# Patient Record
Sex: FEMALE | HISPANIC OR LATINO | Employment: FULL TIME | URBAN - METROPOLITAN AREA
[De-identification: names, ages, dates, MRNs, and addresses within clinical notes are randomized per-mention and may not be internally consistent; named-entity substitution may affect disease eponyms.]

---

## 2013-09-29 LAB
EXTERNAL HIV CONFIRMATION: NORMAL
EXTERNAL HIV SCREEN: NORMAL

## 2019-02-27 ENCOUNTER — OFFICE VISIT (OUTPATIENT)
Dept: FAMILY MEDICINE CLINIC | Facility: CLINIC | Age: 34
End: 2019-02-27
Payer: COMMERCIAL

## 2019-02-27 VITALS
WEIGHT: 206.4 LBS | BODY MASS INDEX: 32.39 KG/M2 | DIASTOLIC BLOOD PRESSURE: 78 MMHG | HEIGHT: 67 IN | SYSTOLIC BLOOD PRESSURE: 120 MMHG

## 2019-02-27 DIAGNOSIS — N92.6 IRREGULAR PERIODS: ICD-10-CM

## 2019-02-27 DIAGNOSIS — R63.5 WEIGHT GAIN: ICD-10-CM

## 2019-02-27 DIAGNOSIS — R42 DIZZINESS: ICD-10-CM

## 2019-02-27 DIAGNOSIS — F43.9 STRESS AT HOME: ICD-10-CM

## 2019-02-27 DIAGNOSIS — E66.9 OBESITY (BMI 30-39.9): Primary | ICD-10-CM

## 2019-02-27 DIAGNOSIS — Z13.220 SCREENING FOR HYPERLIPIDEMIA: ICD-10-CM

## 2019-02-27 PROCEDURE — 1036F TOBACCO NON-USER: CPT | Performed by: FAMILY MEDICINE

## 2019-02-27 PROCEDURE — 99203 OFFICE O/P NEW LOW 30 MIN: CPT | Performed by: FAMILY MEDICINE

## 2019-02-27 PROCEDURE — 3008F BODY MASS INDEX DOCD: CPT | Performed by: FAMILY MEDICINE

## 2019-02-27 NOTE — PROGRESS NOTES
Assessment/Plan:  Chief Complaint   Patient presents with    SLP Initial Evaluation     new patient here to establish care    Weight Gain     noticed throughout the last year has gained about 40 lbs  Has as irregular menstrual cycle   Dizziness     concerned of having low blood sugars     Patient Instructions   Here to establish care and has had some weight gain 40 pounds in the last year  There is much stress at home and work and also will need to see her GYN and also SBE encouraged as well as dental exams  Check labs for hx of dizziness  F-up with GYN for irregular periods  No problem-specific Assessment & Plan notes found for this encounter  Diagnoses and all orders for this visit:    Obesity (BMI 30-39 9)  -     TSH, 3rd generation; Future  -     T4, free  -     Hemoglobin A1C    Weight gain  -     Hemoglobin A1C    Dizziness  -     Comprehensive metabolic panel; Future  -     CBC and differential; Future  -     Hemoglobin A1C    Stress at home  -     Hemoglobin A1C    Screening for hyperlipidemia  -     Comprehensive metabolic panel; Future  -     Lipid Panel with Direct LDL reflex; Future  -     TSH, 3rd generation; Future  -     T4, free    Irregular periods  -     Hemoglobin A1C          Subjective:      Patient ID: Evi Soares is a 28 y o  female  Here for establishing care  SLP Initial Evaluation (new patient here to establish care)  Weight Gain (noticed throughout the last year has gained about 40 lbs  Has as irregular menstrual cycle  )  Dizziness (concerned of having low blood sugars)    Has had stress from a separation in the last year and eating more for comfort  Her period has also stopped 6 months ago but did get her period last month         The following portions of the patient's history were reviewed and updated as appropriate: allergies, current medications, past family history, past medical history, past social history, past surgical history and problem list     Review of Systems   Constitutional:        Obesity   HENT: Negative  Eyes: Negative  Respiratory: Negative  Cardiovascular: Negative  Gastrointestinal: Negative  Endocrine: Negative  Genitourinary:        Irregular periods   Musculoskeletal: Negative  Skin: Negative  Allergic/Immunologic: Negative  Neurological: Negative  Hematological: Negative  Psychiatric/Behavioral: Negative  Objective:      /78   Ht 5' 6 5" (1 689 m)   Wt 93 6 kg (206 lb 6 4 oz)   BMI 32 81 kg/m²          Physical Exam   Constitutional: She is oriented to person, place, and time  She appears well-developed and well-nourished  obesity   HENT:   Head: Normocephalic and atraumatic  Right Ear: External ear normal    Left Ear: External ear normal    Nose: Nose normal    Mouth/Throat: Oropharynx is clear and moist    Eyes: Pupils are equal, round, and reactive to light  Conjunctivae and EOM are normal    Neck: Normal range of motion  Neck supple  Cardiovascular: Normal rate, regular rhythm, normal heart sounds and intact distal pulses  Pulmonary/Chest: Effort normal and breath sounds normal    Musculoskeletal: Normal range of motion  Neurological: She is alert and oriented to person, place, and time  She has normal reflexes  Skin: Skin is warm and dry  Psychiatric: She has a normal mood and affect   Her behavior is normal

## 2019-02-27 NOTE — PATIENT INSTRUCTIONS
Here to establish care and has had some weight gain 40 pounds in the last year  There is much stress at home and work and also will need to see her GYN and also SBE encouraged as well as dental exams  Check labs for hx of dizziness  F-up with GYN for irregular periods

## 2019-03-13 LAB
ALBUMIN SERPL-MCNC: 4.2 G/DL (ref 3.6–5.1)
ALBUMIN/GLOB SERPL: 1.4 (CALC) (ref 1–2.5)
ALP SERPL-CCNC: 112 U/L (ref 33–115)
ALT SERPL-CCNC: 17 U/L (ref 6–29)
AST SERPL-CCNC: 15 U/L (ref 10–30)
BASOPHILS # BLD AUTO: 44 CELLS/UL (ref 0–200)
BASOPHILS NFR BLD AUTO: 0.5 %
BILIRUB SERPL-MCNC: 0.4 MG/DL (ref 0.2–1.2)
BUN SERPL-MCNC: 14 MG/DL (ref 7–25)
BUN/CREAT SERPL: NORMAL (CALC) (ref 6–22)
CALCIUM SERPL-MCNC: 9 MG/DL (ref 8.6–10.2)
CHLORIDE SERPL-SCNC: 104 MMOL/L (ref 98–110)
CHOLEST SERPL-MCNC: 177 MG/DL
CHOLEST/HDLC SERPL: 3.8 (CALC)
CO2 SERPL-SCNC: 27 MMOL/L (ref 20–32)
CREAT SERPL-MCNC: 0.79 MG/DL (ref 0.5–1.1)
EOSINOPHIL # BLD AUTO: 78 CELLS/UL (ref 15–500)
EOSINOPHIL NFR BLD AUTO: 0.9 %
ERYTHROCYTE [DISTWIDTH] IN BLOOD BY AUTOMATED COUNT: 12.4 % (ref 11–15)
GLOBULIN SER CALC-MCNC: 3 G/DL (CALC) (ref 1.9–3.7)
GLUCOSE SERPL-MCNC: 93 MG/DL (ref 65–99)
HBA1C MFR BLD: 5.7 % OF TOTAL HGB
HCT VFR BLD AUTO: 37.1 % (ref 35–45)
HDLC SERPL-MCNC: 46 MG/DL
HGB BLD-MCNC: 12.7 G/DL (ref 11.7–15.5)
LDLC SERPL CALC-MCNC: 108 MG/DL (CALC)
LYMPHOCYTES # BLD AUTO: 1897 CELLS/UL (ref 850–3900)
LYMPHOCYTES NFR BLD AUTO: 21.8 %
MCH RBC QN AUTO: 31.6 PG (ref 27–33)
MCHC RBC AUTO-ENTMCNC: 34.2 G/DL (ref 32–36)
MCV RBC AUTO: 92.3 FL (ref 80–100)
MONOCYTES # BLD AUTO: 531 CELLS/UL (ref 200–950)
MONOCYTES NFR BLD AUTO: 6.1 %
NEUTROPHILS # BLD AUTO: 6151 CELLS/UL (ref 1500–7800)
NEUTROPHILS NFR BLD AUTO: 70.7 %
NONHDLC SERPL-MCNC: 131 MG/DL (CALC)
PLATELET # BLD AUTO: 359 THOUSAND/UL (ref 140–400)
PMV BLD REES-ECKER: 9.2 FL (ref 7.5–12.5)
POTASSIUM SERPL-SCNC: 4.2 MMOL/L (ref 3.5–5.3)
PROT SERPL-MCNC: 7.2 G/DL (ref 6.1–8.1)
RBC # BLD AUTO: 4.02 MILLION/UL (ref 3.8–5.1)
SL AMB EGFR AFRICAN AMERICAN: 113 ML/MIN/1.73M2
SL AMB EGFR NON AFRICAN AMERICAN: 98 ML/MIN/1.73M2
SODIUM SERPL-SCNC: 138 MMOL/L (ref 135–146)
T4 FREE SERPL-MCNC: 1 NG/DL (ref 0.8–1.8)
TRIGL SERPL-MCNC: 115 MG/DL
TSH SERPL-ACNC: 1.96 MIU/L
WBC # BLD AUTO: 8.7 THOUSAND/UL (ref 3.8–10.8)

## 2019-05-21 ENCOUNTER — OFFICE VISIT (OUTPATIENT)
Dept: FAMILY MEDICINE CLINIC | Facility: CLINIC | Age: 34
End: 2019-05-21
Payer: COMMERCIAL

## 2019-05-21 VITALS
BODY MASS INDEX: 31.74 KG/M2 | WEIGHT: 202.2 LBS | HEIGHT: 67 IN | DIASTOLIC BLOOD PRESSURE: 76 MMHG | SYSTOLIC BLOOD PRESSURE: 122 MMHG | TEMPERATURE: 99.3 F | HEART RATE: 86 BPM | OXYGEN SATURATION: 99 %

## 2019-05-21 DIAGNOSIS — J32.9 SINUSITIS, UNSPECIFIED CHRONICITY, UNSPECIFIED LOCATION: ICD-10-CM

## 2019-05-21 DIAGNOSIS — J06.9 UPPER RESPIRATORY TRACT INFECTION, UNSPECIFIED TYPE: ICD-10-CM

## 2019-05-21 DIAGNOSIS — J04.0 LARYNGITIS: ICD-10-CM

## 2019-05-21 DIAGNOSIS — R05.9 COUGH: Primary | ICD-10-CM

## 2019-05-21 PROCEDURE — 99213 OFFICE O/P EST LOW 20 MIN: CPT | Performed by: FAMILY MEDICINE

## 2019-05-21 RX ORDER — PROMETHAZINE HYDROCHLORIDE AND CODEINE PHOSPHATE 6.25; 1 MG/5ML; MG/5ML
5 SYRUP ORAL EVERY 12 HOURS PRN
Qty: 120 ML | Refills: 0 | Status: SHIPPED | OUTPATIENT
Start: 2019-05-21 | End: 2019-06-10 | Stop reason: ALTCHOICE

## 2019-05-21 RX ORDER — AZITHROMYCIN 250 MG/1
TABLET, FILM COATED ORAL
Qty: 6 TABLET | Refills: 0 | Status: SHIPPED | OUTPATIENT
Start: 2019-05-21 | End: 2019-05-26

## 2019-06-10 ENCOUNTER — OFFICE VISIT (OUTPATIENT)
Dept: FAMILY MEDICINE CLINIC | Facility: CLINIC | Age: 34
End: 2019-06-10
Payer: COMMERCIAL

## 2019-06-10 VITALS
DIASTOLIC BLOOD PRESSURE: 82 MMHG | HEART RATE: 79 BPM | BODY MASS INDEX: 31.86 KG/M2 | TEMPERATURE: 98.5 F | OXYGEN SATURATION: 99 % | WEIGHT: 203 LBS | HEIGHT: 67 IN | SYSTOLIC BLOOD PRESSURE: 120 MMHG

## 2019-06-10 DIAGNOSIS — R73.03 PREDIABETES: Primary | ICD-10-CM

## 2019-06-10 DIAGNOSIS — F43.9 STRESS: ICD-10-CM

## 2019-06-10 DIAGNOSIS — E66.9 OBESITY (BMI 30-39.9): ICD-10-CM

## 2019-06-10 PROCEDURE — 1036F TOBACCO NON-USER: CPT | Performed by: FAMILY MEDICINE

## 2019-06-10 PROCEDURE — 3008F BODY MASS INDEX DOCD: CPT | Performed by: FAMILY MEDICINE

## 2019-06-10 PROCEDURE — 99213 OFFICE O/P EST LOW 20 MIN: CPT | Performed by: FAMILY MEDICINE

## 2020-05-30 DIAGNOSIS — K04.7 DENTAL INFECTION: ICD-10-CM

## 2020-05-30 DIAGNOSIS — G89.18 POST-OP PAIN: Primary | ICD-10-CM

## 2020-05-30 RX ORDER — CHLORHEXIDINE GLUCONATE 0.12 MG/ML
15 RINSE ORAL 2 TIMES DAILY
Qty: 210 ML | Refills: 0 | Status: SHIPPED | OUTPATIENT
Start: 2020-05-30 | End: 2020-06-06

## 2020-05-30 RX ORDER — HYDROCODONE BITARTRATE AND ACETAMINOPHEN 5; 325 MG/1; MG/1
1 TABLET ORAL EVERY 6 HOURS PRN
Qty: 8 TABLET | Refills: 0 | Status: SHIPPED | OUTPATIENT
Start: 2020-05-30 | End: 2022-06-04 | Stop reason: ALTCHOICE

## 2020-05-30 RX ORDER — AMOXICILLIN AND CLAVULANATE POTASSIUM 875; 125 MG/1; MG/1
1 TABLET, FILM COATED ORAL EVERY 12 HOURS SCHEDULED
Qty: 14 TABLET | Refills: 0 | Status: SHIPPED | OUTPATIENT
Start: 2020-05-30 | End: 2020-06-06

## 2020-05-30 RX ORDER — IBUPROFEN 600 MG/1
600 TABLET ORAL EVERY 6 HOURS PRN
Qty: 20 TABLET | Refills: 0 | Status: SHIPPED | OUTPATIENT
Start: 2020-05-30 | End: 2020-06-04

## 2020-06-24 ENCOUNTER — TELEPHONE (OUTPATIENT)
Dept: FAMILY MEDICINE CLINIC | Facility: CLINIC | Age: 35
End: 2020-06-24

## 2020-06-24 DIAGNOSIS — E66.9 OBESITY (BMI 30-39.9): Primary | ICD-10-CM

## 2020-06-26 ENCOUNTER — OFFICE VISIT (OUTPATIENT)
Dept: BARIATRICS | Facility: CLINIC | Age: 35
End: 2020-06-26

## 2020-06-26 DIAGNOSIS — R63.5 ABNORMAL WEIGHT GAIN: Primary | ICD-10-CM

## 2020-06-26 PROCEDURE — RECHECK

## 2020-06-26 PROCEDURE — WMDI30

## 2020-07-08 ENCOUNTER — TELEPHONE (OUTPATIENT)
Dept: OTHER | Facility: OTHER | Age: 35
End: 2020-07-08

## 2020-07-08 NOTE — TELEPHONE ENCOUNTER
Patient called to cancel the appointment for 0830 today  Patient requests to be called to reschedule

## 2021-08-12 ENCOUNTER — TELEPHONE (OUTPATIENT)
Dept: ADMINISTRATIVE | Facility: OTHER | Age: 36
End: 2021-08-12

## 2021-08-12 NOTE — TELEPHONE ENCOUNTER
Upon review of the In Basket request we were able to locate, review, and update the patient chart as requested for Pap Smear (HPV) aka Cervical Cancer Screening  Any additional questions or concerns should be emailed to the Practice Liaisons via [email or web address] email, please do not reply via In Basket      Thank you  Katie Dykes

## 2021-08-12 NOTE — TELEPHONE ENCOUNTER
----- Message from Clarke Garcia sent at 8/11/2021  8:49 AM EDT -----  Regarding: Request Quality  08/11/21 8:49 AM    Hello, our patient Scooby Baig has had Pap Smear (HPV) aka Cervical Cancer Screening completed/performed  Please assist in updating the patient chart by pulling the Care Everywhere (CE) document  The date of service is 11/04/2020       Thank you,  Clarke Garcia  [id number]

## 2021-08-16 ENCOUNTER — OFFICE VISIT (OUTPATIENT)
Dept: FAMILY MEDICINE CLINIC | Facility: CLINIC | Age: 36
End: 2021-08-16
Payer: COMMERCIAL

## 2021-08-16 VITALS
HEART RATE: 78 BPM | WEIGHT: 213.2 LBS | RESPIRATION RATE: 16 BRPM | DIASTOLIC BLOOD PRESSURE: 82 MMHG | HEIGHT: 66 IN | SYSTOLIC BLOOD PRESSURE: 124 MMHG | BODY MASS INDEX: 34.27 KG/M2 | TEMPERATURE: 97.5 F | OXYGEN SATURATION: 99 %

## 2021-08-16 DIAGNOSIS — R73.03 PREDIABETES: ICD-10-CM

## 2021-08-16 DIAGNOSIS — B07.0 PLANTAR WART OF RIGHT FOOT: ICD-10-CM

## 2021-08-16 DIAGNOSIS — Z00.00 HEALTH CARE MAINTENANCE: Primary | ICD-10-CM

## 2021-08-16 DIAGNOSIS — Z13.220 SCREENING FOR HYPERLIPIDEMIA: ICD-10-CM

## 2021-08-16 DIAGNOSIS — E66.9 OBESITY (BMI 30-39.9): ICD-10-CM

## 2021-08-16 PROCEDURE — 3725F SCREEN DEPRESSION PERFORMED: CPT | Performed by: FAMILY MEDICINE

## 2021-08-16 PROCEDURE — 99395 PREV VISIT EST AGE 18-39: CPT | Performed by: FAMILY MEDICINE

## 2021-08-16 PROCEDURE — 3008F BODY MASS INDEX DOCD: CPT | Performed by: FAMILY MEDICINE

## 2021-08-16 PROCEDURE — 1036F TOBACCO NON-USER: CPT | Performed by: FAMILY MEDICINE

## 2021-08-16 NOTE — PROGRESS NOTES
Assessment/Plan:  Chief Complaint   Patient presents with    Weight Loss     patient would like to discuss she is having difficulty losing weight     Foot Injury     patient is having right foot pain scale 6/10     Annual Exam     Patient Instructions   Here for general PE and will need updated labs and will need to see GYN and also rec podiatry consult for right heel wart and callous right heel  Lose weight via diet and exercise, discussed HR zone 2 and weight loss  Recheck yearly for annual PE  COVID 19 vaccinated  She will call if interested in weight loss consult  No problem-specific Assessment & Plan notes found for this encounter  Diagnoses and all orders for this visit:    Health care maintenance  -     Comprehensive metabolic panel; Future  -     CBC and differential; Future  -     Lipid Panel with Direct LDL reflex; Future  -     TSH, 3rd generation; Future  -     T4, free  -     Hemoglobin A1C    Prediabetes  -     Comprehensive metabolic panel; Future  -     Hemoglobin A1C    Obesity (BMI 30-39 9)  -     Comprehensive metabolic panel; Future  -     TSH, 3rd generation; Future  -     T4, free    Plantar wart of right foot  -     Ambulatory referral to Podiatry; Future    Screening for hyperlipidemia  -     Comprehensive metabolic panel; Future  -     Lipid Panel with Direct LDL reflex; Future  -     TSH, 3rd generation; Future  -     T4, free          Subjective:      Patient ID: Saadia Arnett is a 38 y o  female     having difficulty losing weight )  Foot Injury (patient is having right foot pain scale 6/10 )  Annual Exam  Tried keto and Intermittent fasting and low carb and starting to affect knees and       The following portions of the patient's history were reviewed and updated as appropriate: allergies, current medications, past family history, past medical history, past social history, past surgical history and problem list     Review of Systems   Constitutional: Negative

## 2021-08-16 NOTE — PATIENT INSTRUCTIONS
Here for general PE and will need updated labs and will need to see GYN and also rec podiatry consult for right heel wart and callous right heel  Lose weight via diet and exercise, discussed HR zone 2 and weight loss  Recheck yearly for annual PE  COVID 19 vaccinated  She will call if interested in weight loss consult

## 2021-08-24 ENCOUNTER — OFFICE VISIT (OUTPATIENT)
Dept: URGENT CARE | Facility: CLINIC | Age: 36
End: 2021-08-24

## 2021-08-24 VITALS
TEMPERATURE: 99.4 F | RESPIRATION RATE: 16 BRPM | SYSTOLIC BLOOD PRESSURE: 140 MMHG | OXYGEN SATURATION: 98 % | DIASTOLIC BLOOD PRESSURE: 88 MMHG | HEART RATE: 74 BPM

## 2021-08-24 DIAGNOSIS — S96.911A ANKLE STRAIN, RIGHT, INITIAL ENCOUNTER: Primary | ICD-10-CM

## 2021-08-24 RX ORDER — NAPROXEN 500 MG/1
500 TABLET ORAL 2 TIMES DAILY WITH MEALS
Qty: 20 TABLET | Refills: 0 | Status: SHIPPED | OUTPATIENT
Start: 2021-08-24 | End: 2021-09-03

## 2021-08-24 NOTE — ASSESSMENT & PLAN NOTE
History and exam findings consistent with ankle strain secondary to altered gait due to pain at site of wart and callous on heel  Ace wrap and ice pack applied, symptoms improved  Motor and sensory function intact distally after application, capillary refill <2 seconds  Recommend pt f/u with podiatry for treatment of wart and callous, and rest, ice, compression, and NSAIDs for ankle sprain  Gentle stretching recommended, advance slowly as tolerated  Work note provided to wear supportive footwear in the office, such as sneakers  Reasons to follow up reviewed with pt

## 2021-08-24 NOTE — PROGRESS NOTES
[facility]        NAME: Reese Cruz is a 38 y o  female  : 1985    MRN: [id number]  DATE: 2021  TIME: 9:58 AM    Assessment and Plan   Ankle strain, right, initial encounter [S96 911A]  1  Ankle strain, right, initial encounter  naproxen (NAPROSYN) 500 mg tablet         Patient Instructions       Follow up with podiatry for removal of wart and callous  Wear ace wrap during the day  Remove at night  Apply ice several times daily  Take Naproxen as prescribed, take with food  Wear supportive footwear  Recommend gentle stretching, advance slowly as tolerated  Follow up with your PCP or return to the clinic if symptoms worsen or persist more than 7-10 days  Proceed to  ER if symptoms worsen  Chief Complaint     Chief Complaint   Patient presents with    Ankle Pain     Right         History of Present Illness       R ankle swelling x 1 week  She saw her PCP and was diagnosed with a wart and callous on her R heel  No swelling at that time  She was referred to podiatry for removal but has not seen them yet  Pain to site of wart on R plantar side of heel when walking  Swelling to R lateral ankle started afterwards  Swelling is worse later in the day  No falls or injuries reported  She has been soaking her foot in warm epsom salts- mild improvement  No fever, chills, or warmth  Review of Systems   Review of Systems   Constitutional: Negative  Respiratory: Negative  Cardiovascular: Negative  Musculoskeletal: Positive for gait problem, joint swelling (swelling to R lateral ankle) and myalgias  Skin: Positive for wound  Rash: wart and callous to R heel  All other systems reviewed and are negative          Current Medications       Current Outpatient Medications:     HYDROcodone-acetaminophen (NORCO) 5-325 mg per tablet, Take 1 tablet by mouth every 6 (six) hours as needed for painMax Daily Amount: 4 tablets (Patient not taking: Reported on 2021), Disp: 8 tablet, Rfl: 0    ibuprofen (MOTRIN) 600 mg tablet, Take 1 tablet (600 mg total) by mouth every 6 (six) hours as needed for mild pain or moderate pain for up to 5 days, Disp: 20 tablet, Rfl: 0    naproxen (NAPROSYN) 500 mg tablet, Take 1 tablet (500 mg total) by mouth 2 (two) times a day with meals for 10 days, Disp: 20 tablet, Rfl: 0    Current Allergies     Allergies as of 2021    (No Known Allergies)            The following portions of the patient's history were reviewed and updated as appropriate: allergies, current medications, past family history, past medical history, past social history, past surgical history and problem list      Past Medical History:   Diagnosis Date    Prediabetes        Past Surgical History:   Procedure Laterality Date     SECTION         Family History   Problem Relation Age of Onset    Breast cancer Family     Diabetes Mother     Heart disease Father          Medications have been verified  Objective   /88 (BP Location: Right arm, Patient Position: Sitting, Cuff Size: Adult)   Pulse 74   Temp 99 4 °F (37 4 °C) (Tympanic)   Resp 16   LMP 2021 (Exact Date)   SpO2 98%   Patient's last menstrual period was 2021 (exact date)  Physical Exam     Physical Exam  Constitutional:       Appearance: She is well-developed  HENT:      Head: Normocephalic and atraumatic  Musculoskeletal:         General: Swelling and tenderness present  No signs of injury  Right ankle: Swelling present  No deformity, ecchymosis or lacerations  Tenderness present over the CF ligament  Normal range of motion  Anterior drawer test negative  Normal pulse  Right Achilles Tendon: Normal       Left ankle: Normal       Left Achilles Tendon: Normal       Right foot: Normal capillary refill  Tenderness (wart noted to  R heel, plantar side with surrounding callous and fissure) present  No bony tenderness  Normal pulse        Left foot: Normal  Legs:    Skin:     General: Skin is warm and dry  Capillary Refill: Capillary refill takes less than 2 seconds  Neurological:      General: No focal deficit present  Mental Status: She is alert and oriented to person, place, and time     Psychiatric:         Mood and Affect: Mood normal          Behavior: Behavior normal

## 2021-08-24 NOTE — PATIENT INSTRUCTIONS
Follow up with podiatry for removal of wart and callous  Wear ace wrap during the day  Remove at night  Apply ice several times daily  Take Naproxen as prescribed, take with food  Wear supportive footwear  Recommend gentle stretching, advance slowly as tolerated  Follow up with your PCP or return to the clinic if symptoms worsen or persist more than 7-10 days  Ankle Strain   AMBULATORY CARE:   An ankle strain  is a twist, pull, or tear of a muscle or tendon in your ankle  An acute strain is a strain that happens suddenly  A chronic strain can happen over several days or weeks  A chronic strain can be caused by moving the muscle and tendon in your ankle the same way over and over  Common signs and symptoms include the following:   · Pain and swelling in your ankle    · Trouble moving your ankle    · Muscle spasm, cramping, or weakness    · Bruising over your ankle    Seek care immediately if:   · You have severe pain in your ankle when you rest or put pressure on it  · Your foot or toes are cold or numb  · Your swelling has increased  Contact your healthcare provider if:   · Your pain or swelling do not go away, even after treatment  · You have questions or concerns about your condition or care  Treatment:   · A support device , such as crutches or a brace, may be needed to decrease your pain as you move around  · NSAIDs , such as ibuprofen, help decrease swelling, pain, and fever  This medicine is available with or without a doctor's order  NSAIDs can cause stomach bleeding or kidney problems in certain people  If you take blood thinner medicine, always ask if NSAIDs are safe for you  Always read the medicine label and follow directions  Do not give these medicines to children under 10months of age without direction from your child's healthcare provider  · Acetaminophen  decreases pain  It is available without a doctor's order  Ask how much to take and how often to take it  Follow directions  Acetaminophen can cause liver damage if not taken correctly  · Physical therapy  may be recommended after your ankle strain has healed  A physical therapist teaches you exercises to help improve movement and strength, and to decrease pain  · Surgery  may be needed if you have a severe strain  Manage your symptoms:   · Rest  your ankle so that it can heal  Return to normal activities as directed  · Apply ice on your ankle for 15 to 20 minutes every hour or as directed  Use an ice pack, or put crushed ice in a plastic bag  Cover it with a towel  Ice helps prevent tissue damage and decreases swelling and pain  · Compress  your ankle as directed  Ask your healthcare provider how to wrap an elastic bandage around your ankle  An elastic bandage provides support and helps decrease swelling and movement so your ankle can heal  Wear it as long as directed  · Elevate  your ankle above the level of your heart as often as you can  This will help decrease swelling and pain  Prop your ankle on pillows or blankets to keep it elevated comfortably  Prevent an ankle strain:   · Always wear proper shoes when you play sports  Replace your old running shoes with new ones often if you are a runner  Use special shoe inserts or arch supports to correct leg or foot problems  Ask your healthcare provider for more information on shoe supports  · Do warm up and cool down exercises  Stretch before you work out or do sports activities  This will help loosen your muscles and prevent injury  Cool down and stretch after your workout  Do not stop and rest after a workout without cooling down first      · Do strength training exercises  Exercises such as weight lifting help keep your muscles flexible and strong  A physical therapist or  may help you with these exercises       · Slowly start your exercise or sports training program   Follow your healthcare provider's advice on when to start exercising  Slowly increase time, distance, and intensity of your exercises  Sudden increases in how often or how intensely you train may cause you to injure your muscle again  Follow up with your healthcare provider as directed:  Write down your questions so you remember to ask them during your visits  © Copyright [employer] 2021 Information is for End User's use only and may not be sold, redistributed or otherwise used for commercial purposes  All illustrations and images included in CareNotes® are the copyrighted property of [employer]  or Ange Mejia  The above information is an  only  It is not intended as medical advice for individual conditions or treatments  Talk to your doctor, nurse or pharmacist before following any medical regimen to see if it is safe and effective for you

## 2021-08-24 NOTE — LETTER
August 24, 2021     Patient: Elsa De La Rosa   YOB: 1985   Date of Visit: 8/24/2021       To Whom It May Concern: It is my medical opinion that Landy Brown can return to work while wearing sneakers for proper foot support       If you have any questions or concerns, please don't hesitate to call           Sincerely,        [facility] [address] [facility]    CC: No Recipients

## 2021-08-31 ENCOUNTER — TELEPHONE (OUTPATIENT)
Dept: FAMILY MEDICINE CLINIC | Facility: CLINIC | Age: 36
End: 2021-08-31

## 2021-08-31 DIAGNOSIS — Z20.822 ENCOUNTER FOR LABORATORY TESTING FOR COVID-19 VIRUS: Primary | ICD-10-CM

## 2021-08-31 NOTE — TELEPHONE ENCOUNTER
Patient called stating she was exposed to an employee that is positive for covid, she exposed on 08/25/21  No mask worn and was at lunch with her, patient has no symptoms as of now  Can we order a covid test for her  I did advise patient to quarantine until she gets the results back

## 2021-09-01 PROCEDURE — U0005 INFEC AGEN DETEC AMPLI PROBE: HCPCS | Performed by: FAMILY MEDICINE

## 2021-09-01 PROCEDURE — U0003 INFECTIOUS AGENT DETECTION BY NUCLEIC ACID (DNA OR RNA); SEVERE ACUTE RESPIRATORY SYNDROME CORONAVIRUS 2 (SARS-COV-2) (CORONAVIRUS DISEASE [COVID-19]), AMPLIFIED PROBE TECHNIQUE, MAKING USE OF HIGH THROUGHPUT TECHNOLOGIES AS DESCRIBED BY CMS-2020-01-R: HCPCS | Performed by: FAMILY MEDICINE

## 2021-09-01 NOTE — TELEPHONE ENCOUNTER
Spoke with patient and gave her the instructions and phone number for [phone] [facility], patient understood

## 2021-09-02 ENCOUNTER — APPOINTMENT (OUTPATIENT)
Dept: URGENT CARE | Facility: CLINIC | Age: 36
End: 2021-09-02

## 2021-09-02 DIAGNOSIS — Z00.00 HEALTH CARE MAINTENANCE: Primary | ICD-10-CM

## 2021-09-02 DIAGNOSIS — E66.9 OBESITY (BMI 30-39.9): ICD-10-CM

## 2021-09-02 DIAGNOSIS — Z13.220 SCREENING FOR HYPERLIPIDEMIA: ICD-10-CM

## 2021-09-02 DIAGNOSIS — R73.03 PREDIABETES: ICD-10-CM

## 2021-09-02 LAB
ALBUMIN SERPL BCP-MCNC: 3.2 G/DL (ref 3.5–5)
ALP SERPL-CCNC: 115 U/L (ref 46–116)
ALT SERPL W P-5'-P-CCNC: 29 U/L (ref 12–78)
ANION GAP SERPL CALCULATED.3IONS-SCNC: 5 MMOL/L (ref 4–13)
AST SERPL W P-5'-P-CCNC: 17 U/L (ref 5–45)
BASOPHILS # BLD AUTO: 0.04 THOUSANDS/ΜL (ref 0–0.1)
BASOPHILS NFR BLD AUTO: 0 % (ref 0–1)
BILIRUB SERPL-MCNC: 0.29 MG/DL (ref 0.2–1)
BUN SERPL-MCNC: 13 MG/DL (ref 5–25)
CALCIUM ALBUM COR SERPL-MCNC: 9 MG/DL (ref 8.3–10.1)
CALCIUM SERPL-MCNC: 8.4 MG/DL (ref 8.3–10.1)
CHLORIDE SERPL-SCNC: 109 MMOL/L (ref 100–108)
CHOLEST SERPL-MCNC: 173 MG/DL (ref 50–200)
CO2 SERPL-SCNC: 25 MMOL/L (ref 21–32)
CREAT SERPL-MCNC: 0.69 MG/DL (ref 0.6–1.3)
EOSINOPHIL # BLD AUTO: 0.07 THOUSAND/ΜL (ref 0–0.61)
EOSINOPHIL NFR BLD AUTO: 1 % (ref 0–6)
ERYTHROCYTE [DISTWIDTH] IN BLOOD BY AUTOMATED COUNT: 12.6 % (ref 11.6–15.1)
EST. AVERAGE GLUCOSE BLD GHB EST-MCNC: 120 MG/DL
GFR SERPL CREATININE-BSD FRML MDRD: 112 ML/MIN/1.73SQ M
GLUCOSE P FAST SERPL-MCNC: 71 MG/DL (ref 65–99)
HBA1C MFR BLD: 5.8 %
HCT VFR BLD AUTO: 39 % (ref 34.8–46.1)
HDLC SERPL-MCNC: 44 MG/DL
HGB BLD-MCNC: 12.7 G/DL (ref 11.5–15.4)
IMM GRANULOCYTES # BLD AUTO: 0.04 THOUSAND/UL (ref 0–0.2)
IMM GRANULOCYTES NFR BLD AUTO: 0 % (ref 0–2)
LDLC SERPL CALC-MCNC: 108 MG/DL (ref 0–100)
LYMPHOCYTES # BLD AUTO: 2.61 THOUSANDS/ΜL (ref 0.6–4.47)
LYMPHOCYTES NFR BLD AUTO: 26 % (ref 14–44)
MCH RBC QN AUTO: 31.2 PG (ref 26.8–34.3)
MCHC RBC AUTO-ENTMCNC: 32.6 G/DL (ref 31.4–37.4)
MCV RBC AUTO: 96 FL (ref 82–98)
MONOCYTES # BLD AUTO: 0.58 THOUSAND/ΜL (ref 0.17–1.22)
MONOCYTES NFR BLD AUTO: 6 % (ref 4–12)
NEUTROPHILS # BLD AUTO: 6.56 THOUSANDS/ΜL (ref 1.85–7.62)
NEUTS SEG NFR BLD AUTO: 67 % (ref 43–75)
NRBC BLD AUTO-RTO: 0 /100 WBCS
PLATELET # BLD AUTO: 380 THOUSANDS/UL (ref 149–390)
PMV BLD AUTO: 9.1 FL (ref 8.9–12.7)
POTASSIUM SERPL-SCNC: 3.8 MMOL/L (ref 3.5–5.3)
PROT SERPL-MCNC: 7.5 G/DL (ref 6.4–8.2)
RBC # BLD AUTO: 4.07 MILLION/UL (ref 3.81–5.12)
SODIUM SERPL-SCNC: 139 MMOL/L (ref 136–145)
T4 FREE SERPL-MCNC: 0.9 NG/DL (ref 0.76–1.46)
TRIGL SERPL-MCNC: 103 MG/DL
TSH SERPL DL<=0.05 MIU/L-ACNC: 1.99 UIU/ML (ref 0.36–3.74)
WBC # BLD AUTO: 9.9 THOUSAND/UL (ref 4.31–10.16)

## 2021-09-02 PROCEDURE — 85025 COMPLETE CBC W/AUTO DIFF WBC: CPT | Performed by: FAMILY MEDICINE

## 2021-09-02 PROCEDURE — 80061 LIPID PANEL: CPT | Performed by: FAMILY MEDICINE

## 2021-09-02 PROCEDURE — 80053 COMPREHEN METABOLIC PANEL: CPT | Performed by: FAMILY MEDICINE

## 2021-09-02 PROCEDURE — 83036 HEMOGLOBIN GLYCOSYLATED A1C: CPT | Performed by: FAMILY MEDICINE

## 2021-09-02 PROCEDURE — 84443 ASSAY THYROID STIM HORMONE: CPT | Performed by: FAMILY MEDICINE

## 2021-09-02 PROCEDURE — 84439 ASSAY OF FREE THYROXINE: CPT | Performed by: FAMILY MEDICINE

## 2021-09-02 NOTE — LETTER
September 2, 2021    Patient:  Vangie Scott  YOB: 1985  Date of Last Encounter: 8/24/2021    To whom it may concern: Vangie Scott has tested negative for COVID-19 (Coronavirus)  She may return to work on 9/2/2021      Sincerely,        ZACH Garza

## 2021-09-02 NOTE — PROGRESS NOTES
Pt has labwork ordered by her PCP  She has been fasting  Labwork drawn and sent to the lab  Pt is also requesting a letter to return to work after receiving her negative COVID test result  Return to work letter written and given to pt

## 2022-02-24 ENCOUNTER — ANNUAL EXAM (OUTPATIENT)
Dept: OBGYN CLINIC | Facility: CLINIC | Age: 37
End: 2022-02-24
Payer: COMMERCIAL

## 2022-02-24 VITALS
BODY MASS INDEX: 33.87 KG/M2 | WEIGHT: 215.8 LBS | DIASTOLIC BLOOD PRESSURE: 84 MMHG | SYSTOLIC BLOOD PRESSURE: 140 MMHG | HEIGHT: 67 IN

## 2022-02-24 DIAGNOSIS — Z01.419 WOMEN'S ANNUAL ROUTINE GYNECOLOGICAL EXAMINATION: Primary | ICD-10-CM

## 2022-02-24 PROCEDURE — G0476 HPV COMBO ASSAY CA SCREEN: HCPCS | Performed by: OBSTETRICS & GYNECOLOGY

## 2022-02-24 PROCEDURE — S0610 ANNUAL GYNECOLOGICAL EXAMINA: HCPCS | Performed by: OBSTETRICS & GYNECOLOGY

## 2022-02-24 PROCEDURE — G0145 SCR C/V CYTO,THINLAYER,RESCR: HCPCS | Performed by: OBSTETRICS & GYNECOLOGY

## 2022-02-24 NOTE — PATIENT INSTRUCTIONS
The patient was informed of a stable gyn examination  A Pap smear was performed  The pelvic exam was benign  She will continue self-breast examinations  She should return my office in 1 year  She will use condoms if she needs contraception in the future  She will be able to call if any new issues arise

## 2022-02-24 NOTE — PROGRESS NOTES
Assessment/Plan:    The patient was informed of a stable gyn examination  A Pap smear was performed  She would like to lose more weight  She feels safe at home  She reminded importance of seeing a dentist on a regular basis  We had a discussion about using condoms if she needed contraception  She should return my office in 1 year unless new issues occur  Subjective:      Patient ID: Bryanna Dunlap is a 39 y o  female  HPI    This is a 69-year-old white female, she is a  2 para 2 with 1  section followed by 1 vaginal birth  Currently she is not in a sexual relation for over a year  Her menstrual cycles are regular sometimes heavy but under control  She has received the COVID vaccine and booster  She feels safe at home  She does have a history of anxiety which is under control  She is not happy with her weight  She has a dentist on a regular basis  There are no new major family illnesses report  Family history is positive for paternal grandmother with breast cancer  Mother diabetes, father with heart disease  She has had 1 C section  She has a history of an abnormal Pap smear  Positive for HPV  She is not aware of she has never been her diagnosis of dysplasia  We will do a Pap smear today  The following portions of the patient's history were reviewed and updated as appropriate: allergies, current medications, past family history, past medical history, past social history, past surgical history and problem list     Review of Systems   All other systems reviewed and are negative  Objective:      /84   Ht 5' 7" (1 702 m)   Wt 97 9 kg (215 lb 12 8 oz)   LMP 2022   BMI 33 80 kg/m²          Physical Exam  Vitals reviewed  Exam conducted with a chaperone present  Constitutional:       Appearance: Normal appearance  HENT:      Head: Normocephalic and atraumatic  Eyes:      Extraocular Movements: Extraocular movements intact     Cardiovascular: Rate and Rhythm: Normal rate and regular rhythm  Pulses: Normal pulses  Heart sounds: Normal heart sounds  Pulmonary:      Effort: Pulmonary effort is normal       Breath sounds: Normal breath sounds  Chest:   Breasts:      Right: Normal  No swelling, bleeding, inverted nipple, mass, nipple discharge, skin change, tenderness, axillary adenopathy or supraclavicular adenopathy  Left: Normal  No swelling, bleeding, inverted nipple, mass, nipple discharge, skin change, tenderness, axillary adenopathy or supraclavicular adenopathy  Abdominal:      General: Abdomen is flat  Bowel sounds are normal  There is no distension  Palpations: Abdomen is soft  There is no hepatomegaly, splenomegaly or mass  Tenderness: There is no abdominal tenderness  There is no guarding or rebound  Hernia: No hernia is present  There is no hernia in the umbilical area, ventral area, left inguinal area or right inguinal area  Comments:  section scar now well-healed  Genitourinary:     General: Normal vulva  Pubic Area: No rash or pubic lice  Labia:         Right: No rash, tenderness, lesion or injury  Left: No rash, tenderness, lesion or injury  Urethra: No prolapse, urethral pain, urethral swelling or urethral lesion  Vagina: Normal  No signs of injury and foreign body  No vaginal discharge, erythema, tenderness, bleeding, lesions or prolapsed vaginal walls  Cervix: Normal       Uterus: Normal  Not deviated, not enlarged, not fixed, not tender and no uterine prolapse  Adnexa: Right adnexa normal and left adnexa normal         Right: No mass, tenderness or fullness  Left: No mass, tenderness or fullness  Rectum: Normal       Comments: The external genitalia normal limits the vagina is clean a Pap smear was performed  The cervix is closed but normal appearance uterus is anterior normal size is no cervical motion tenderness  There is no prolapse of the bladder the urethra or or uterus  A Pap smear was performed  Musculoskeletal:      Cervical back: Normal range of motion and neck supple  Lymphadenopathy:      Upper Body:      Right upper body: No supraclavicular, axillary or pectoral adenopathy  Left upper body: No supraclavicular, axillary or pectoral adenopathy  Skin:     General: Skin is warm and dry  Neurological:      General: No focal deficit present  Mental Status: She is alert and oriented to person, place, and time  Psychiatric:         Mood and Affect: Mood normal          Behavior: Behavior normal          Thought Content:  Thought content normal

## 2022-02-25 LAB
HPV HR 12 DNA CVX QL NAA+PROBE: NEGATIVE
HPV16 DNA CVX QL NAA+PROBE: NEGATIVE
HPV18 DNA CVX QL NAA+PROBE: NEGATIVE

## 2022-03-03 LAB
LAB AP GYN PRIMARY INTERPRETATION: NORMAL
LAB AP LMP: NORMAL
Lab: NORMAL

## 2022-04-05 ENCOUNTER — TELEMEDICINE (OUTPATIENT)
Dept: FAMILY MEDICINE CLINIC | Facility: CLINIC | Age: 37
End: 2022-04-05
Payer: COMMERCIAL

## 2022-04-05 ENCOUNTER — TELEPHONE (OUTPATIENT)
Dept: FAMILY MEDICINE CLINIC | Facility: CLINIC | Age: 37
End: 2022-04-05

## 2022-04-05 DIAGNOSIS — U07.1 COVID-19 VIRUS INFECTION: Primary | ICD-10-CM

## 2022-04-05 PROCEDURE — 99213 OFFICE O/P EST LOW 20 MIN: CPT | Performed by: NURSE PRACTITIONER

## 2022-04-05 PROCEDURE — 1036F TOBACCO NON-USER: CPT | Performed by: NURSE PRACTITIONER

## 2022-04-05 NOTE — TELEPHONE ENCOUNTER
Pt called the office requesting we please correct her work note stating  that she did test positive for Covid 19  Pt's note was corrected as she requested

## 2022-04-05 NOTE — LETTER
[facility]  [address] [address]  [phone]  Dept: [phone]    April 5, 2022    Patient: Adriel Colunga  YOB: 1985    Adriel Colunga was seen and evaluated at our [facility]  She tested positive for April 4th, 2022  She may return to work on 4/11/22, as this is 5 days from the onset of symptoms  Upon return, they must then adhere to strict masking for an additional 5 days      Sincerely,    [address]

## 2022-04-05 NOTE — PROGRESS NOTES
COVID-19 Outpatient Progress Note    Assessment/Plan:    Problem List Items Addressed This Visit     None      Visit Diagnoses     COVID-19 virus infection    -  Primary         Disposition:     Patient has COVID-19 infection  Based off CDC guidelines, they were recommended to isolate for 5 days from the date of the positive test  If they remain asymptomatic, isolation may be ended followed by 5 days of wearing a mask when around othes to minimize risk of infecting others  If they have a fever, continue to stay home until fever resolves for at least 24 hours  Discussed symptom directed medication options with patient  Continue with isolation  Discussed precautions/ return to work plan  Discussed monoclonal antibody treatment with patient and she declines use  Advised to call if she changes her mind  Overall has had improvement today  Please call the office if you are experiencing any worsening of symptoms or no symptom improvement  I have spent 15 minutes directly with the patient  Greater than 50% of this time was spent in counseling/coordination of care regarding: instructions for management, patient and family education and impressions  Encounter provider [facility]    Provider located at [address] [place] [address] PRIMARY CARE  [address] [facility] [address] [address] [phone]    Recent Visits  No visits were found meeting these conditions  Showing recent visits within past 7 days and meeting all other requirements  Today's Visits  Date Type Provider Dept   04/05/22 Telemedicine Taylor Jenkins, [facility]   Showing today's visits and meeting all other requirements  Future Appointments  No visits were found meeting these conditions    Showing future appointments within next 150 days and meeting all other requirements     This virtual check-in was done via [employer] and patient was informed that this is a secure, HIPAA-compliant platform  She agrees to proceed  Patient agrees to participate in a virtual check in via telephone or video visit instead of presenting to the office to address urgent/immediate medical needs  Patient is aware this is a billable service  After connecting through [facility], the patient was identified by name and date of birth  Danya Daniel was informed that this was a telemedicine visit and that the exam was being conducted confidentially over secure lines  My office door was closed  No one else was in the room  Norah Willson Read acknowledged consent and understanding of privacy and security of the telemedicine visit  I informed the patient that I have reviewed her record in Epic and presented the opportunity for her to ask any questions regarding the visit today  The patient agreed to participate  Verification of patient location:  Patient is located in the following state in which I hold an active license: [place]    Subjective: Danya Daniel is a 39 y o  female who has been screened for COVID-19  Symptom change since last report: improving  Patient's symptoms include nasal congestion, rhinorrhea, loss of taste, cough and nausea  Patient denies fever (yesterday), chills (yesterday), fatigue, malaise, sore throat, anosmia, shortness of breath, chest tightness, abdominal pain, vomiting, diarrhea, myalgias and headaches (yesterday)  - Date of symptom onset: 4/3/2022  - Date of positive COVID-19 test: 4/4/2022  Type of test: PCR  COVID-19 vaccination status: Fully vaccinated (primary series)    Norah has been staying home and has isolated themselves in her home  She is taking care to not share personal items and is cleaning all surfaces that are touched often, like counters, tabletops, and doorknobs using household cleaning sprays or wipes  She is wearing a mask when she leaves her room  She is taking mildred seltzer plus and ibuprofen for symptoms       Lab Results   Component Value Date    SARSCOV2 Negative 2021    SARSCORONAVI Detected (A) 2022     Past Medical History:   Diagnosis Date    Prediabetes      Past Surgical History:   Procedure Laterality Date     SECTION      MOUTH SURGERY       Current Outpatient Medications   Medication Sig Dispense Refill    HYDROcodone-acetaminophen (NORCO) 5-325 mg per tablet Take 1 tablet by mouth every 6 (six) hours as needed for painMax Daily Amount: 4 tablets (Patient not taking: Reported on 2021) 8 tablet 0    ibuprofen (MOTRIN) 600 mg tablet Take 1 tablet (600 mg total) by mouth every 6 (six) hours as needed for mild pain or moderate pain for up to 5 days 20 tablet 0    naproxen (NAPROSYN) 500 mg tablet Take 1 tablet (500 mg total) by mouth 2 (two) times a day with meals for 10 days 20 tablet 0     No current facility-administered medications for this visit  No Known Allergies    Review of Systems   Constitutional: Negative for chills (yesterday), fatigue and fever (yesterday)  HENT: Positive for congestion and rhinorrhea  Negative for sore throat  Respiratory: Positive for cough  Negative for chest tightness and shortness of breath  Gastrointestinal: Positive for nausea  Negative for abdominal pain, diarrhea and vomiting  Musculoskeletal: Negative for myalgias  Neurological: Negative for headaches (yesterday)  Objective: There were no vitals filed for this visit  Physical Exam  Constitutional:       General: She is not in acute distress  Appearance: Normal appearance  She is not ill-appearing, toxic-appearing or diaphoretic  HENT:      Head: Normocephalic and atraumatic  Nose: Nose normal    Pulmonary:      Effort: Pulmonary effort is normal  No respiratory distress  Skin:     Coloration: Skin is not pale  Neurological:      General: No focal deficit present  Mental Status: She is alert and oriented to person, place, and time     Psychiatric:         Mood and Affect: Mood normal          VIRTUAL VISIT DISCLAIMER    Norah Jamaal Guadarrama verbally agrees to participate in [employer]  Pt is aware that [employer] could be limited without vital signs or the ability to perform a full hands-on physical Pamelia Organ understands she or the provider may request at any time to terminate the video visit and request the patient to seek care or treatment in person

## 2022-06-04 ENCOUNTER — OFFICE VISIT (OUTPATIENT)
Dept: URGENT CARE | Age: 37
End: 2022-06-04
Payer: COMMERCIAL

## 2022-06-04 ENCOUNTER — AMB VIDEO VISIT (OUTPATIENT)
Dept: OTHER | Facility: HOSPITAL | Age: 37
End: 2022-06-04

## 2022-06-04 VITALS
HEART RATE: 95 BPM | TEMPERATURE: 97.2 F | HEIGHT: 67 IN | WEIGHT: 211 LBS | OXYGEN SATURATION: 99 % | RESPIRATION RATE: 18 BRPM | BODY MASS INDEX: 33.12 KG/M2

## 2022-06-04 DIAGNOSIS — J02.9 SORE THROAT: Primary | ICD-10-CM

## 2022-06-04 DIAGNOSIS — J02.9 PHARYNGITIS, UNSPECIFIED ETIOLOGY: Primary | ICD-10-CM

## 2022-06-04 PROBLEM — S96.911A ANKLE STRAIN, RIGHT, INITIAL ENCOUNTER: Status: RESOLVED | Noted: 2021-08-24 | Resolved: 2022-06-04

## 2022-06-04 LAB — S PYO AG THROAT QL: NEGATIVE

## 2022-06-04 PROCEDURE — 99213 OFFICE O/P EST LOW 20 MIN: CPT

## 2022-06-04 PROCEDURE — 87880 STREP A ASSAY W/OPTIC: CPT

## 2022-06-04 PROCEDURE — ECARE PR SL URGENT CARE VIRTUAL VISIT: Performed by: PHYSICIAN ASSISTANT

## 2022-06-04 RX ORDER — AMOXICILLIN 500 MG/1
500 CAPSULE ORAL EVERY 12 HOURS SCHEDULED
Qty: 20 CAPSULE | Refills: 0 | Status: SHIPPED | OUTPATIENT
Start: 2022-06-04 | End: 2022-06-14

## 2022-06-04 NOTE — PROGRESS NOTES
Video Visit - Marcia Middleton 39 y o  female MRN: [id number]    REQUIRED DOCUMENTATION:         1  This service was provided via [employer]  2  Provider located at [address] [place] [id number]  3  [facility] provider: Whit Burger PA-C   4  Identify all parties in room with patient during [facility] visit:  child(kvng)- permission granted  5  After connecting through [employer], patient was identified by name and date of birth  Patient was then informed that this was a Telemedicine visit and that the exam was being conducted confidentially over secure lines  My office door was closed  No one else was in the room  Patient acknowledged consent and understanding of privacy and security of the Telemedicine visit  I informed the patient that I have reviewed their record in Epic and presented the opportunity for them to ask any questions regarding the visit today  The patient agreed to participate  VITALS: Heart Rate: 80 BPM, Respiratory Rate: 16 RPM, Temperature Unavailable° F, Blood Pressure Unavailable mmHg, Pulse Ox Unavailable % on RA    HPI  Pt reports sore throat starting yesterday  Reports painful to swallow, R > L throat pain  Pain radiates to Right ear  Ibuprofen with slight relief of pain  Tried vicks drops without relief  Felt warm yesterday, but didn't measure temperature  Denies known sick contacts  No fevers  Had [facility] in April, unlikely to be COVID  Physical Exam  Constitutional:       General: She is in acute distress (mild)  Appearance: Normal appearance  She is not ill-appearing or toxic-appearing  HENT:      Head: Normocephalic and atraumatic  Nose: No rhinorrhea  Mouth/Throat:      Mouth: Mucous membranes are moist       Comments: Couldn't coordinate well enough to see pharynx also has a large tongue  Eyes:      Conjunctiva/sclera: Conjunctivae normal    Cardiovascular:      Rate and Rhythm: Normal rate     Pulmonary:      Effort: Pulmonary effort is normal  No respiratory distress  Breath sounds: No wheezing (no gross audible wheeze through computer)  Musculoskeletal:      Cervical back: Normal range of motion  Tenderness present  Lymphadenopathy:      Cervical: Cervical adenopathy present  Skin:     Findings: No rash (on face or neck)  Neurological:      Mental Status: She is alert  Cranial Nerves: No dysarthria or facial asymmetry  Psychiatric:         Mood and Affect: Mood normal          Behavior: Behavior normal      Called and spoke with Joshua Hill at [facility] who is aware of pt  Diagnoses and all orders for this visit:    Pharyngitis, unspecified etiology  -     Cancel: Throat culture;  Future  -     Transfer to other facility      Patient Instructions    Go to [address], [employer], [address]

## 2022-06-04 NOTE — CARE ANYWHERE EVISITS
Visit Summary for Norah Thomas - Gender: Female - Date of Birth: [id number]  Date: [id number] - Duration: 15 minutes  Patient: Norah Thomas  Provider: Michelle Pepper PA-C    Patient Contact Information  Address  [address] ; [address] [place] [address]  [phone]    Visit Topics  Possible strep throat, ear ache [Added By: Self - 2022-06-04]    Triage Questions   What is your current physical address in the event of a medical emergency? Answer []  Are you allergic to any medications? Answer []  Are you now or could you be pregnant? Answer []  Do you have any immune system compromise or chronic lung   disease? Answer []  Do you have any vulnerable family members in the home (infant, pregnant, cancer, elderly)? Answer []     Conversation Transcripts  [0A][0A] [Notification] You are connected with Michelle Pepper PA-C, Urgent Care Specialist [0A][Notification] Karina Baig is located in [place]  [0A][Notification] Karina Baig has shared health history  Susannah Bowen  [0A]    Diagnosis  Acute pharyngitis    Procedures  Value: [id number] Code: CPT-4 UNLISTED E&M SERVICE    Medications Prescribed    No prescriptions ordered    Electronically signed by: Fatemeh Ashley(NPI [id number])

## 2022-06-06 ENCOUNTER — TELEPHONE (OUTPATIENT)
Dept: FAMILY MEDICINE CLINIC | Facility: CLINIC | Age: 37
End: 2022-06-06

## 2022-06-06 NOTE — TELEPHONE ENCOUNTER
Pt is going to screen shot her test results from [facility] and send a message via my chart to [facility]

## 2022-06-06 NOTE — TELEPHONE ENCOUNTER
Patient called this morning  She is asking if we can go over a result for her  She was in the ER and they did some testing  They did an STD test and she is confused on these results  I told her we cannot see them just yet and we would get back to her  She advised that she is going to call the ER and see if they can go over these with her and then call us if needed

## 2022-11-28 ENCOUNTER — TELEMEDICINE (OUTPATIENT)
Dept: FAMILY MEDICINE CLINIC | Facility: CLINIC | Age: 37
End: 2022-11-28

## 2022-11-28 DIAGNOSIS — B34.9 VIRAL INFECTION, UNSPECIFIED: Primary | ICD-10-CM

## 2022-11-28 NOTE — PROGRESS NOTES
COVID-19 Outpatient Progress Note    Assessment/Plan:    Problem List Items Addressed This Visit    None     Disposition:     Recommended patient to come to the office to test for COVID-19/Influenza  While awaiting the results of covid testing, patient was advised to isolate  Discussed symptom directed medication options with patient  I have spent 8 minutes directly with the patient  Greater than 50% of this time was spent in counseling/coordination of care regarding: diagnostic results, prognosis, risks and benefits of treatment options, instructions for management, patient and family education, risk factor reductions and impressions  Encounter provider: Jazmyne Randle DO     Provider located at: [address] [facility]  [address] [place] [address] [address]  [facility] [address] [phone]     Recent Visits  No visits were found meeting these conditions  Showing recent visits within past 7 days and meeting all other requirements  Future Appointments  No visits were found meeting these conditions  Showing future appointments within next 150 days and meeting all other requirements     This virtual check-in was done via [employer] and patient was informed that this is a secure, HIPAA-compliant platform  She agrees to proceed  Patient agrees to participate in a virtual check in via telephone or video visit instead of presenting to the office to address urgent/immediate medical needs  Patient is aware this is a billable service  She acknowledged consent and understanding of privacy and security of the video platform  The patient has agreed to participate and understands they can discontinue the visit at any time  After connecting through [facility], the patient was identified by name and date of birth  Ildefonso Stiles was informed that this was a telemedicine visit and that the exam was being conducted confidentially over secure lines   My office door was closed  No one else was in the room  Norah Springer acknowledged consent and understanding of privacy and security of the telemedicine visit  I informed the patient that I have reviewed her record in Epic and presented the opportunity for her to ask any questions regarding the visit today  The patient agreed to participate  Verification of patient location:  Patient is located in the following state in which I hold an active license: [place]    Subjective: Rashad Begum is a 44 y o  female who is concerned about COVID-19  Patient's symptoms include fever, chills, fatigue, malaise, nasal congestion, rhinorrhea, sore throat, cough, myalgias and headache  Patient denies anosmia, loss of taste, shortness of breath, chest tightness, abdominal pain, nausea, vomiting and diarrhea       - Date of symptom onset: 11/26/2022      COVID-19 vaccination status: Fully vaccinated (primary series)    Exposure:   Contact with a person who is under investigation (PUI) for or who is positive for COVID-19 within the last 14 days?: Yes    Hospitalized recently for fever and/or lower respiratory symptoms?: No      Currently a healthcare worker that is involved in direct patient care?: No      Works in a special setting where the risk of COVID-19 transmission may be high? (this may include long-term care, correctional and FDC facilities; homeless shelters; assisted-living facilities and group homes ): No      Resident in a special setting where the risk of COVID-19 transmission may be high? (this may include long-term care, correctional and FDC facilities; homeless shelters; assisted-living facilities and group homes ): No      Patient states that she feels like she did when she had covid She did not test She is taking tylneol and mildred seltzer plus    Lab Results   Component Value Date    SARSCOV2 Negative 09/01/2021    SARSCORONAVI Detected (A) 04/04/2022       Review of Systems   Constitutional: Positive for chills, fatigue and fever  HENT: Positive for congestion, rhinorrhea and sore throat  Respiratory: Positive for cough  Negative for chest tightness and shortness of breath  Gastrointestinal: Negative for abdominal pain, diarrhea, nausea and vomiting  Musculoskeletal: Positive for myalgias  Neurological: Positive for headaches  Current Outpatient Medications on File Prior to Visit   Medication Sig   • ibuprofen (MOTRIN) 600 mg tablet Take 1 tablet (600 mg total) by mouth every 6 (six) hours as needed for mild pain or moderate pain for up to 5 days   • naproxen (NAPROSYN) 500 mg tablet Take 1 tablet (500 mg total) by mouth 2 (two) times a day with meals for 10 days       Objective: There were no vitals taken for this visit  Physical Exam  Constitutional:       Appearance: She is obese  HENT:      Head: Normocephalic  Right Ear: External ear normal       Left Ear: External ear normal    Eyes:      Extraocular Movements: Extraocular movements intact  Conjunctiva/sclera: Conjunctivae normal       Pupils: Pupils are equal, round, and reactive to light  Pulmonary:      Breath sounds: Normal breath sounds  Neurological:      General: No focal deficit present  Mental Status: She is alert and oriented to person, place, and time  Psychiatric:         Mood and Affect: Mood normal          Behavior: Behavior normal          Thought Content:  Thought content normal          Judgment: Judgment normal        Jasmyn Overlie, DO

## 2022-12-01 ENCOUNTER — TELEPHONE (OUTPATIENT)
Dept: FAMILY MEDICINE CLINIC | Facility: CLINIC | Age: 37
End: 2022-12-01

## 2022-12-01 LAB
FLUAV RNA RESP QL NAA+PROBE: POSITIVE
FLUBV RNA RESP QL NAA+PROBE: NEGATIVE
SARS-COV-2 RNA RESP QL NAA+PROBE: NEGATIVE

## 2022-12-01 NOTE — TELEPHONE ENCOUNTER
Patient is calling stating she saw her test results from yesterday and she is positive for the flu  She is currently at work and is asking if she should not be at work due to spreading it to coworkers  She is currently wearing a mask and she is in her own cubical     Please advise patient at [phone]

## 2022-12-01 NOTE — TELEPHONE ENCOUNTER
Spoke with patient and gave her the information, she did request a work note as well and I did type up and sent it to her [facility]

## 2023-02-14 ENCOUNTER — TELEMEDICINE (OUTPATIENT)
Dept: FAMILY MEDICINE CLINIC | Facility: CLINIC | Age: 38
End: 2023-02-14

## 2023-02-14 DIAGNOSIS — U07.1 COVID-19: Primary | ICD-10-CM

## 2023-02-14 NOTE — PROGRESS NOTES
It was my intent to perform this visit via video technology but the patient was not able to do a video connection so the visit was completed via audio telephone only  COVID-19 Outpatient Progress Note    Assessment/Plan:    Problem List Items Addressed This Visit    None  Visit Diagnoses     COVID-19    -  Primary         Disposition:     Patient has asymptomatic or mild COVID-19 infection  Based off CDC guidelines, they were recommended to isolate for 5 days  If they are asymptomatic or symptoms are improving with no fevers in the past 24 hours, isolation may be ended followed by 5 days of wearing a mask when around othes to minimize risk of infecting others  If still have a fever or other symptoms have not improved, continue to isolate until they improve  Regardless of when they end isolation, avoid being around people who are more likely to get very sick from COVID-19 until at least day 11  I have spent 15 minutes directly with the patient  Greater than 50% of this time was spent in counseling/coordination of care regarding: instructions for management and impressions  Encounter provider: Michaela Pandey DO     Provider located at: [address] [place] [address] PRIMARY CARE  [address] [place] [address] [address]  [facility] [id number] [phone]     Recent Visits  No visits were found meeting these conditions  Showing recent visits within past 7 days and meeting all other requirements  Today's Visits  Date Type Provider Dept   02/14/23 Telemedicine Michaela Pandey, [address] [place] [address] Primary Care   Showing today's visits and meeting all other requirements  Future Appointments  No visits were found meeting these conditions  Showing future appointments within next 150 days and meeting all other requirements     This virtual check-in was done via telephone and she agrees to proceed      Patient agrees to participate in a virtual check in via telephone or video visit instead of presenting to the office to address urgent/immediate medical needs  Patient is aware this is a billable service  She acknowledged consent and understanding of privacy and security of the video platform  The patient has agreed to participate and understands they can discontinue the visit at any time  After connecting through Telephone, the patient was identified by name and date of birth  Fermin Ring was informed that this was a telemedicine visit and that the exam was being conducted confidentially over secure lines  My office door was closed  No one else was in the room  Norah Cali Keegan acknowledged consent and understanding of privacy and security of the telemedicine visit  I informed the patient that I have reviewed her record in Epic and presented the opportunity for her to ask any questions regarding the visit today  The patient agreed to participate  It was my intent to perform this visit via video technology but the patient was not able to do a video connection so the visit was completed via audio telephone only  Verification of patient location:  Patient is located in the following state in which I hold an active license: [place]    Subjective: Fermin Ring is a 44 y o  female who has been screened for COVID-19  Symptom change since last report: resolving  Patient's symptoms include nasal congestion  Patient denies fever, cough and shortness of breath  - Date of symptom onset: 2/12/2023  - Date of positive COVID-19 test: 2/13/2023  Type of test: Home antigen  COVID-19 vaccination status: Fully vaccinated (primary series)    Norah has been staying home and has isolated themselves in her home  She is taking care to not share personal items and is cleaning all surfaces that are touched often, like counters, tabletops, and doorknobs using household cleaning sprays or wipes  She is wearing a mask when she leaves her room       Lab Results   Component Value Date    SARSCOV2 Negative 11/30/2022    SARSCORONAVI Detected (A) 04/04/2022       Review of Systems   Constitutional: Negative  Negative for fever  HENT: Positive for congestion and sneezing  Eyes: Negative  Respiratory: Negative  Negative for cough and shortness of breath  Cardiovascular: Negative  Gastrointestinal: Negative  Endocrine: Negative  Genitourinary: Negative  Musculoskeletal: Negative  Skin: Negative  Allergic/Immunologic: Negative  Neurological: Negative  Hematological: Negative  Psychiatric/Behavioral: Negative  Current Outpatient Medications on File Prior to Visit   Medication Sig   • ibuprofen (MOTRIN) 600 mg tablet Take 1 tablet (600 mg total) by mouth every 6 (six) hours as needed for mild pain or moderate pain for up to 5 days   • naproxen (NAPROSYN) 500 mg tablet Take 1 tablet (500 mg total) by mouth 2 (two) times a day with meals for 10 days       Objective: There were no vitals taken for this visit  Physical Exam  Pulmonary:      Effort: Pulmonary effort is normal  No respiratory distress  Neurological:      General: No focal deficit present  Mental Status: She is alert and oriented to person, place, and time  Psychiatric:         Mood and Affect: Mood normal          Behavior: Behavior normal          Thought Content: Thought content normal          Judgment: Judgment normal        Patient Instructions   Rest and fluids and self isolate as directed covid 19 vaccinated and rec Tylenol prn pain/fever and start Vitamin D and c and zinc and call if any unresolved fever or sob/resp distress  Recheck in 5 days  Has mild cold symptoms today and may use Dimetapp DM cold and cough prn         Neemakain Gloria, DO

## 2023-02-14 NOTE — PATIENT INSTRUCTIONS
Rest and fluids and self isolate as directed covid 19 vaccinated and rec Tylenol prn pain/fever and start Vitamin D and c and zinc and call if any unresolved fever or sob/resp distress  Recheck in 5 days  Has mild cold symptoms today and may use Dimetapp DM cold and cough prn

## 2023-02-16 ENCOUNTER — TELEMEDICINE (OUTPATIENT)
Dept: FAMILY MEDICINE CLINIC | Facility: CLINIC | Age: 38
End: 2023-02-16

## 2023-02-16 DIAGNOSIS — U07.1 COVID-19: Primary | ICD-10-CM

## 2023-02-16 NOTE — PATIENT INSTRUCTIONS
Call if any problems  COVID 19 cleared from self isolation and will rec taking vitamin D and C an zinc and rec to also to call if any problems  Call if fever or sob/resp distress

## 2023-02-16 NOTE — PROGRESS NOTES
COVID-19 Outpatient Progress Note    Assessment/Plan:    Problem List Items Addressed This Visit    None  Visit Diagnoses     COVID-19    -  Primary         Disposition:     Patient has asymptomatic or mild COVID-19 infection  Based off CDC guidelines, they were recommended to isolate for 5 days  If they are asymptomatic or symptoms are improving with no fevers in the past 24 hours, isolation may be ended followed by 5 days of wearing a mask when around othes to minimize risk of infecting others  If still have a fever or other symptoms have not improved, continue to isolate until they improve  Regardless of when they end isolation, avoid being around people who are more likely to get very sick from COVID-19 until at least day 11  I have spent 15 minutes directly with the patient  Greater than 50% of this time was spent in counseling/coordination of care regarding: instructions for management and impressions  Encounter provider: Evert Sanchez DO     Provider located at: [address] [place] [id number] [phone]     Recent Visits  Date Type Provider Dept   02/14/23 Telemedicine Evert Sanchez, [facility]   Showing recent visits within past 7 days and meeting all other requirements  Today's Visits  Date Type Provider Dept   02/16/23 Telemedicine Evert Sanchez, [facility]   Showing today's visits and meeting all other requirements  Future Appointments  No visits were found meeting these conditions  Showing future appointments within next 150 days and meeting all other requirements       Patient agrees to participate in a virtual check in via telephone or video visit instead of presenting to the office to address urgent/immediate medical needs  Patient is aware this is a billable service   She acknowledged consent and understanding of privacy and security of the video platform  The patient has agreed to participate and understands they can discontinue the visit at any time  After connecting through [facility], the patient was identified by name and date of birth  Ashley Cast was informed that this was a telemedicine visit and that the exam was being conducted confidentially over secure lines  My office door was closed  No one else was in the room  Norah Man acknowledged consent and understanding of privacy and security of the telemedicine visit  I informed the patient that I have reviewed her record in Epic and presented the opportunity for her to ask any questions regarding the visit today  The patient agreed to participate  Verification of patient location:  Patient is located in the following state in which I hold an active license: [place]    Subjective: Ashley Cast is a 44 y o  female who has been screened for COVID-19  Symptom change since last report: resolving  Patient denies fever, cough and shortness of breath  - Date of symptom onset: 2/12/2023  - Date of positive COVID-19 test: 2/13/2023  Type of test: Home antigen  COVID-19 vaccination status: Fully vaccinated (primary series)    Norah has been staying home and has isolated themselves in her home  She is taking care to not share personal items and is cleaning all surfaces that are touched often, like counters, tabletops, and doorknobs using household cleaning sprays or wipes  She is wearing a mask when she leaves her room  Lab Results   Component Value Date    SARSCOV2 Negative 11/30/2022    SARSCORONAVI Detected (A) 04/04/2022       Review of Systems   Constitutional: Negative  Negative for fever  HENT: Negative  Eyes: Negative  Respiratory: Negative  Negative for cough and shortness of breath  Cardiovascular: Negative  Gastrointestinal: Negative  Endocrine: Negative  Genitourinary: Negative  Musculoskeletal: Negative  Skin: Negative  Allergic/Immunologic: Negative  Neurological: Negative  Hematological: Negative  Psychiatric/Behavioral: Negative  Current Outpatient Medications on File Prior to Visit   Medication Sig   • ibuprofen (MOTRIN) 600 mg tablet Take 1 tablet (600 mg total) by mouth every 6 (six) hours as needed for mild pain or moderate pain for up to 5 days   • naproxen (NAPROSYN) 500 mg tablet Take 1 tablet (500 mg total) by mouth 2 (two) times a day with meals for 10 days       Objective: There were no vitals taken for this visit  Physical Exam  Constitutional:       Appearance: Normal appearance  Pulmonary:      Effort: Pulmonary effort is normal  No respiratory distress  Neurological:      General: No focal deficit present  Mental Status: She is alert and oriented to person, place, and time  Psychiatric:         Mood and Affect: Mood normal          Behavior: Behavior normal          Thought Content: Thought content normal          Judgment: Judgment normal        Patient Instructions   Call if any problems  COVID 19 cleared from self isolation and will rec taking vitamin D and C an zinc and rec to also to call if any problems  Call if fever or sob/resp distress           Antony Arzate DO

## 2023-03-27 ENCOUNTER — ANNUAL EXAM (OUTPATIENT)
Dept: OBGYN CLINIC | Facility: CLINIC | Age: 38
End: 2023-03-27

## 2023-03-27 VITALS
WEIGHT: 224.4 LBS | HEIGHT: 67 IN | DIASTOLIC BLOOD PRESSURE: 62 MMHG | SYSTOLIC BLOOD PRESSURE: 138 MMHG | BODY MASS INDEX: 35.22 KG/M2

## 2023-03-27 DIAGNOSIS — Z01.419 WOMEN'S ANNUAL ROUTINE GYNECOLOGICAL EXAMINATION: ICD-10-CM

## 2023-03-27 DIAGNOSIS — Z01.419 ENCOUNTER FOR GYNECOLOGICAL EXAMINATION WITHOUT ABNORMAL FINDING: Primary | ICD-10-CM

## 2023-03-27 DIAGNOSIS — Z11.3 SCREENING EXAMINATION FOR STD (SEXUALLY TRANSMITTED DISEASE): ICD-10-CM

## 2023-03-27 RX ORDER — COVID-19 MOLECULAR TEST ASSAY
KIT MISCELLANEOUS
COMMUNITY
Start: 2023-02-13

## 2023-03-27 NOTE — PROGRESS NOTES
"Assessment/Plan:    The patient was informed of a stable GYN examination  A Pap smear was performed  There is no cervical motion tenderness  We will continue to monitor her signs symptoms of stress incontinence  Particular with sneezing  If this becomes worse she will contact my office  We had a discussion about weight loss  She may consider the medical weight loss program   She was reminded of the importance of seeing dentist on a more regular basis  Her anxiety is under control  She should return to my office in 1 year unless new issues occur  Subjective:      Patient ID: Ursula Vaughn is a 44 y o  female  HPI    This is a 76-year-old female, she is a  2 para 2 with 1  section followed by 1 vaginal birth  Currently she is not in a sexual relationship  Her last sexual contact was possibly 6 months ago  She will use condoms for contraception when needed  Her menstrual cycles are under control  Her anxiety is better but still present  Still has a slight stress urinary continence with sneezing  This is not interfering with her lifestyle  She is wearing a pad  At the present time she is not interested in any further evaluation  She is not happy with her weight  We talked about medical weight loss programs  She may consider  She feels safe at home  She was reminded of the importance of seeing a dentist on a regular basis  Stated above her anxiety is under control  There are no new major family illnesses reported at this time  The following portions of the patient's history were reviewed and updated as appropriate: allergies, current medications, past family history, past medical history, past social history, past surgical history and problem list     Review of Systems   HENT: Negative for sinus pain  Genitourinary:        Positive for slight stress urine incontinence  All other systems reviewed and are negative          Objective:      /62   Ht 5' 7\" " (1 702 m)   Wt 102 kg (224 lb 6 4 oz)   LMP 2023 (Approximate)   BMI 35 15 kg/m²          Physical Exam  Vitals reviewed  Exam conducted with a chaperone present  Constitutional:       Appearance: Normal appearance  HENT:      Head: Normocephalic and atraumatic  Nose: Nose normal       Mouth/Throat:      Mouth: Mucous membranes are moist    Eyes:      Extraocular Movements: Extraocular movements intact  Conjunctiva/sclera: Conjunctivae normal       Pupils: Pupils are equal, round, and reactive to light  Cardiovascular:      Rate and Rhythm: Normal rate and regular rhythm  Pulses: Normal pulses  Heart sounds: Normal heart sounds  Pulmonary:      Effort: Pulmonary effort is normal       Breath sounds: Normal breath sounds  Chest:   Breasts:     Breasts are symmetrical       Right: Normal  No swelling, bleeding, inverted nipple, mass, nipple discharge, skin change or tenderness  Left: Normal  No swelling, bleeding, inverted nipple, mass, nipple discharge, skin change or tenderness  Abdominal:      General: Abdomen is flat  A surgical scar is present  Bowel sounds are normal  There is no distension  Palpations: Abdomen is soft  There is no hepatomegaly, splenomegaly, mass or pulsatile mass  Tenderness: There is no abdominal tenderness  There is no guarding or rebound  Hernia: No hernia is present  There is no hernia in the umbilical area, ventral area, left inguinal area or right inguinal area  Comments:  section scar well-healed x1   Genitourinary:     General: Normal vulva  Pubic Area: No rash or pubic lice  Labia:         Right: No rash, tenderness, lesion or injury  Left: No rash or tenderness  Vagina: Normal  No signs of injury and foreign body  No vaginal discharge, erythema, tenderness, bleeding, lesions or prolapsed vaginal walls        Cervix: Normal       Uterus: Normal  Not deviated, not enlarged, not fixed, not tender and no uterine prolapse  Adnexa: Right adnexa normal and left adnexa normal         Right: No mass, tenderness or fullness  Left: No mass, tenderness or fullness  Rectum: Normal       Comments: The external genitalia normal limits the vagina is clean cervix is closed a Pap smear was performed  The adnexa clear bilaterally  There is no evidence of prolapse  Urethra and bladder normal working relationship  Musculoskeletal:         General: Normal range of motion  Cervical back: Normal range of motion and neck supple  Lymphadenopathy:      Upper Body:      Right upper body: No supraclavicular or axillary adenopathy  Skin:     General: Skin is warm and dry  Neurological:      General: No focal deficit present  Mental Status: She is alert     Psychiatric:         Mood and Affect: Mood normal

## 2023-03-27 NOTE — PATIENT INSTRUCTIONS
The patient was informed of a stable GYN examination  We had a discussion about weight loss therapy  Currently not in a sexual relationship she was reminded to use condoms if needed for any kind of protection  A Pap smear was performed  There is no other  or GI complaint  She should return to my office in 1 year unless new issues occur

## 2023-03-30 LAB
C TRACH DNA SPEC QL NAA+PROBE: NEGATIVE
N GONORRHOEA DNA SPEC QL NAA+PROBE: NEGATIVE

## 2023-03-31 LAB
LAB AP GYN PRIMARY INTERPRETATION: NORMAL
Lab: NORMAL

## 2023-07-26 ENCOUNTER — RA CDI HCC (OUTPATIENT)
Dept: OTHER | Facility: HOSPITAL | Age: 38
End: 2023-07-26

## 2023-07-26 NOTE — PROGRESS NOTES
[facility] [address] [facility] coding opportunities       Chart reviewed, no opportunity found: CHART REVIEWED, NO OPPORTUNITY FOUND        Patients Insurance        Commercial Insurance: [employer]

## 2023-08-03 ENCOUNTER — TELEPHONE (OUTPATIENT)
Dept: ADMINISTRATIVE | Facility: OTHER | Age: 38
End: 2023-08-03

## 2023-08-03 NOTE — TELEPHONE ENCOUNTER
----- Message from Fanny Leach sent at 8/2/2023  7:41 AM EDT -----  Regarding: care gap quality update -HIV      Hello, our patient No patient name on file. has had HIV completed/performed. Please assist in updating the patient chart by pulling the Care Everywhere (CE) document. The date of service is 09/29/2013.      Thank you,  Fanny Leach   [address]

## 2023-08-04 NOTE — TELEPHONE ENCOUNTER
Upon review of the In Basket request we were able to locate, review, and update the patient chart as requested for HIV. Any additional questions or concerns should be emailed to the Practice Liaisons via the appropriate education email address, please do not reply via In Basket.     Thank you  Jeremias Macedo

## 2023-08-07 ENCOUNTER — OFFICE VISIT (OUTPATIENT)
Dept: FAMILY MEDICINE CLINIC | Facility: CLINIC | Age: 38
End: 2023-08-07
Payer: COMMERCIAL

## 2023-08-07 VITALS
WEIGHT: 223.4 LBS | SYSTOLIC BLOOD PRESSURE: 126 MMHG | OXYGEN SATURATION: 99 % | HEART RATE: 86 BPM | RESPIRATION RATE: 16 BRPM | BODY MASS INDEX: 35.9 KG/M2 | HEIGHT: 66 IN | DIASTOLIC BLOOD PRESSURE: 82 MMHG

## 2023-08-07 DIAGNOSIS — Z13.220 SCREENING FOR HYPERLIPIDEMIA: ICD-10-CM

## 2023-08-07 DIAGNOSIS — Z00.00 HEALTH CARE MAINTENANCE: Primary | ICD-10-CM

## 2023-08-07 DIAGNOSIS — R53.83 FATIGUE, UNSPECIFIED TYPE: ICD-10-CM

## 2023-08-07 DIAGNOSIS — E66.9 OBESITY (BMI 30-39.9): ICD-10-CM

## 2023-08-07 PROCEDURE — 99395 PREV VISIT EST AGE 18-39: CPT | Performed by: FAMILY MEDICINE

## 2023-08-07 NOTE — PATIENT INSTRUCTIONS
Here for general PE and fatigue and obesity. Rec labs and r/o diabetes. With hx of prediabetes. See GYN as directed and check for iron deficiency and thyroid as well as diabetes.

## 2023-08-07 NOTE — PROGRESS NOTES
Name: Briana Fair      : 1985      MRN: [id number]  Encounter Provider: Namrata Whitt DO  Encounter Date: 2023   Encounter department: [address] [place] [address]  Chief Complaint   Patient presents with   • Physical Exam     Pt would like to discuss her weight      Patient Instructions   Here for general PE and fatigue and obesity. Rec labs and r/o diabetes. With hx of prediabetes. See GYN as directed and check for iron deficiency and thyroid as well as diabetes. Assessment & Plan     1. Health care maintenance  -     Comprehensive metabolic panel; Future  -     CBC and differential; Future  -     Lipid Panel with Direct LDL reflex; Future  -     Hemoglobin A1C  -     TSH, 3rd generation; Future  -     T4, free  -     Iron; Future  -     Ferritin; Future  -     Vitamin D 25 hydroxy; Future    2. Fatigue, unspecified type  -     Comprehensive metabolic panel; Future  -     CBC and differential; Future  -     Lipid Panel with Direct LDL reflex; Future  -     Hemoglobin A1C  -     TSH, 3rd generation; Future  -     T4, free  -     Iron; Future  -     Ferritin; Future  -     Vitamin D 25 hydroxy; Future    3. Obesity (BMI 30-39.9)  -     Comprehensive metabolic panel; Future  -     TSH, 3rd generation; Future  -     T4, free    4. Screening for hyperlipidemia  -     Comprehensive metabolic panel; Future  -     Lipid Panel with Direct LDL reflex; Future  -     TSH, 3rd generation; Future  -     T4, free           Subjective      Physical Exam (Pt would like to discuss her weight ) Tried to lose weight and nothing is working and shows signs of insulin resistance. Patient gets 7 hours of sleep. Sees GYN as directed. Review of Systems   Constitutional: Negative. HENT: Negative. Eyes: Negative. Respiratory: Negative. Cardiovascular: Negative. Gastrointestinal: Negative. Endocrine: Negative. Genitourinary: Negative. Musculoskeletal: Negative. Skin: Negative. Allergic/Immunologic: Negative. Neurological: Negative. Hematological: Negative. Psychiatric/Behavioral: Negative. Current Outpatient Medications on File Prior to Visit   Medication Sig   • BinaxNOW COVID-19 Ag Home Test KIT REFER TO  INSTUCTIONS ON PACKAGING (Patient not taking: Reported on 3/27/2023)   • ibuprofen (MOTRIN) 600 mg tablet Take 1 tablet (600 mg total) by mouth every 6 (six) hours as needed for mild pain or moderate pain for up to 5 days   • naproxen (NAPROSYN) 500 mg tablet Take 1 tablet (500 mg total) by mouth 2 (two) times a day with meals for 10 days       Objective     /82 (BP Location: Left arm, Patient Position: Sitting, Cuff Size: Adult)   Pulse 86   Resp 16   Ht 5' 6" (1.676 m)   Wt 101 kg (223 lb 6.4 oz)   SpO2 99%   BMI 36.06 kg/m²     Physical Exam  Constitutional:       Appearance: She is well-developed. She is obese. HENT:      Head: Normocephalic and atraumatic. Right Ear: External ear normal.      Left Ear: External ear normal.      Nose: Nose normal.   Eyes:      Conjunctiva/sclera: Conjunctivae normal.      Pupils: Pupils are equal, round, and reactive to light. Cardiovascular:      Rate and Rhythm: Normal rate and regular rhythm. Pulses: Normal pulses. Heart sounds: Normal heart sounds. Pulmonary:      Effort: Pulmonary effort is normal.      Breath sounds: Normal breath sounds. Abdominal:      General: Abdomen is flat. Bowel sounds are normal.      Palpations: Abdomen is soft. Musculoskeletal:         General: Normal range of motion. Cervical back: Normal range of motion and neck supple. Skin:     General: Skin is warm and dry. Capillary Refill: Capillary refill takes less than 2 seconds. Neurological:      General: No focal deficit present. Mental Status: She is alert and oriented to person, place, and time. Mental status is at baseline.       Deep Tendon Reflexes: Reflexes are normal and symmetric. Psychiatric:         Mood and Affect: Mood normal.         Behavior: Behavior normal.         Thought Content:  Thought content normal.         Judgment: Judgment normal.       Jael Irizarry, DO

## 2023-08-08 ENCOUNTER — APPOINTMENT (OUTPATIENT)
Dept: LAB | Facility: HOSPITAL | Age: 38
End: 2023-08-08
Payer: COMMERCIAL

## 2023-08-08 DIAGNOSIS — R53.83 FATIGUE, UNSPECIFIED TYPE: ICD-10-CM

## 2023-08-08 DIAGNOSIS — Z13.220 SCREENING FOR HYPERLIPIDEMIA: ICD-10-CM

## 2023-08-08 DIAGNOSIS — E66.9 OBESITY (BMI 30-39.9): ICD-10-CM

## 2023-08-08 DIAGNOSIS — Z00.00 HEALTH CARE MAINTENANCE: ICD-10-CM

## 2023-08-08 LAB
25(OH)D3 SERPL-MCNC: 9.4 NG/ML (ref 30–100)
ALBUMIN SERPL BCP-MCNC: 3.9 G/DL (ref 3.5–5)
ALP SERPL-CCNC: 85 U/L (ref 34–104)
ALT SERPL W P-5'-P-CCNC: 18 U/L (ref 7–52)
ANION GAP SERPL CALCULATED.3IONS-SCNC: 4 MMOL/L
AST SERPL W P-5'-P-CCNC: 15 U/L (ref 13–39)
BASOPHILS # BLD AUTO: 0.04 THOUSANDS/ÂΜL (ref 0–0.1)
BASOPHILS NFR BLD AUTO: 0 % (ref 0–1)
BILIRUB SERPL-MCNC: 0.4 MG/DL (ref 0.2–1)
BUN SERPL-MCNC: 11 MG/DL (ref 5–25)
CALCIUM SERPL-MCNC: 8.5 MG/DL (ref 8.4–10.2)
CHLORIDE SERPL-SCNC: 104 MMOL/L (ref 96–108)
CHOLEST SERPL-MCNC: 174 MG/DL
CO2 SERPL-SCNC: 27 MMOL/L (ref 21–32)
CREAT SERPL-MCNC: 0.72 MG/DL (ref 0.6–1.3)
EOSINOPHIL # BLD AUTO: 0.1 THOUSAND/ÂΜL (ref 0–0.61)
EOSINOPHIL NFR BLD AUTO: 1 % (ref 0–6)
ERYTHROCYTE [DISTWIDTH] IN BLOOD BY AUTOMATED COUNT: 12.4 % (ref 11.6–15.1)
EST. AVERAGE GLUCOSE BLD GHB EST-MCNC: 131 MG/DL
FERRITIN SERPL-MCNC: 61 NG/ML (ref 11–307)
GFR SERPL CREATININE-BSD FRML MDRD: 106 ML/MIN/1.73SQ M
GLUCOSE P FAST SERPL-MCNC: 97 MG/DL (ref 65–99)
HBA1C MFR BLD: 6.2 %
HCT VFR BLD AUTO: 37.8 % (ref 34.8–46.1)
HDLC SERPL-MCNC: 44 MG/DL
HGB BLD-MCNC: 12.2 G/DL (ref 11.5–15.4)
IMM GRANULOCYTES # BLD AUTO: 0.04 THOUSAND/UL (ref 0–0.2)
IMM GRANULOCYTES NFR BLD AUTO: 0 % (ref 0–2)
IRON SERPL-MCNC: 73 UG/DL (ref 50–170)
LDLC SERPL CALC-MCNC: 108 MG/DL (ref 0–100)
LYMPHOCYTES # BLD AUTO: 2.9 THOUSANDS/ÂΜL (ref 0.6–4.47)
LYMPHOCYTES NFR BLD AUTO: 28 % (ref 14–44)
MCH RBC QN AUTO: 30.9 PG (ref 26.8–34.3)
MCHC RBC AUTO-ENTMCNC: 32.3 G/DL (ref 31.4–37.4)
MCV RBC AUTO: 96 FL (ref 82–98)
MONOCYTES # BLD AUTO: 0.83 THOUSAND/ÂΜL (ref 0.17–1.22)
MONOCYTES NFR BLD AUTO: 8 % (ref 4–12)
NEUTROPHILS # BLD AUTO: 6.54 THOUSANDS/ÂΜL (ref 1.85–7.62)
NEUTS SEG NFR BLD AUTO: 63 % (ref 43–75)
NRBC BLD AUTO-RTO: 0 /100 WBCS
PLATELET # BLD AUTO: 346 THOUSANDS/UL (ref 149–390)
PMV BLD AUTO: 8.9 FL (ref 8.9–12.7)
POTASSIUM SERPL-SCNC: 3.9 MMOL/L (ref 3.5–5.3)
PROT SERPL-MCNC: 6.9 G/DL (ref 6.4–8.4)
RBC # BLD AUTO: 3.95 MILLION/UL (ref 3.81–5.12)
SODIUM SERPL-SCNC: 135 MMOL/L (ref 135–147)
T4 FREE SERPL-MCNC: 0.82 NG/DL (ref 0.61–1.12)
TRIGL SERPL-MCNC: 111 MG/DL
TSH SERPL DL<=0.05 MIU/L-ACNC: 2.42 UIU/ML (ref 0.45–4.5)
WBC # BLD AUTO: 10.45 THOUSAND/UL (ref 4.31–10.16)

## 2023-08-08 PROCEDURE — 85025 COMPLETE CBC W/AUTO DIFF WBC: CPT

## 2023-08-08 PROCEDURE — 83036 HEMOGLOBIN GLYCOSYLATED A1C: CPT | Performed by: FAMILY MEDICINE

## 2023-08-08 PROCEDURE — 83540 ASSAY OF IRON: CPT

## 2023-08-08 PROCEDURE — 80061 LIPID PANEL: CPT

## 2023-08-08 PROCEDURE — 82728 ASSAY OF FERRITIN: CPT

## 2023-08-08 PROCEDURE — 84439 ASSAY OF FREE THYROXINE: CPT | Performed by: FAMILY MEDICINE

## 2023-08-08 PROCEDURE — 36415 COLL VENOUS BLD VENIPUNCTURE: CPT

## 2023-08-08 PROCEDURE — 84443 ASSAY THYROID STIM HORMONE: CPT

## 2023-08-08 PROCEDURE — 82306 VITAMIN D 25 HYDROXY: CPT

## 2023-08-08 PROCEDURE — 80053 COMPREHEN METABOLIC PANEL: CPT

## 2024-05-08 NOTE — PROGRESS NOTES
[facility]        NAME: Rashad Begum is a 39 y o  female  : 1985    MRN: [id number]  DATE: 2022  TIME: 12:35 PM    Assessment and Plan   Sore throat [J02 9]  1  Sore throat  Throat culture    POCT rapid strepA    amoxicillin (AMOXIL) 500 mg capsule     Patient presents as a referral from care anywhere  Sore throat , swollen glands, right ear discomfort and chills started yesterday  Denies other symptoms  Assessment notes enlarged , erythematous tonsils with large amount exudate noted  No airway obstruction  Breathing WNL  Cervical adenopathy  Rapid strep negative however patient unable to provide adequate swab sample  Will start on Amoxicillin and send culture  F/U with PCP as needed  Patient Instructions       Follow up with PCP as needed    Chief Complaint     Chief Complaint   Patient presents with    Sore Throat     Sore throat, right ear pain began yesterday         History of Present Illness       Patient presents as a referral from care anywhere  Sore throat , swollen glands, right ear discomfort and chills started yesterday  Denies other symptoms  Review of Systems   Review of Systems   Constitutional: Positive for chills  Negative for fever  HENT: Positive for ear pain and sore throat  Negative for congestion, postnasal drip and sinus pain  Eyes: Negative for pain and itching  Respiratory: Negative for cough, shortness of breath and wheezing  Cardiovascular: Negative for chest pain and palpitations  Gastrointestinal: Negative for abdominal pain, constipation, diarrhea, nausea and vomiting  Genitourinary: Negative for difficulty urinating and hematuria  Musculoskeletal: Negative for arthralgias and myalgias  Skin: Negative for rash  Neurological: Negative for dizziness, light-headedness and headaches  Psychiatric/Behavioral: Negative for agitation and sleep disturbance  The patient is not nervous/anxious            Current Medications Current Outpatient Medications:     amoxicillin (AMOXIL) 500 mg capsule, Take 1 capsule (500 mg total) by mouth every 12 (twelve) hours for 10 days, Disp: 20 capsule, Rfl: 0    ibuprofen (MOTRIN) 600 mg tablet, Take 1 tablet (600 mg total) by mouth every 6 (six) hours as needed for mild pain or moderate pain for up to 5 days, Disp: 20 tablet, Rfl: 0    naproxen (NAPROSYN) 500 mg tablet, Take 1 tablet (500 mg total) by mouth 2 (two) times a day with meals for 10 days, Disp: 20 tablet, Rfl: 0    Current Allergies     Allergies as of 2022    (No Known Allergies)            The following portions of the patient's history were reviewed and updated as appropriate: allergies, current medications, past family history, past medical history, past social history, past surgical history and problem list      Past Medical History:   Diagnosis Date    Prediabetes        Past Surgical History:   Procedure Laterality Date     SECTION      MOUTH SURGERY         Family History   Problem Relation Age of Onset    Breast cancer Family     Diabetes Mother     Heart disease Father          Medications have been verified  Objective   Pulse 95   Temp (!) 97 2 °F (36 2 °C)   Resp 18   Ht 5' 7" (1 702 m)   Wt 95 7 kg (211 lb)   SpO2 99%   BMI 33 05 kg/m²   No LMP recorded  Physical Exam     Physical Exam  Vitals reviewed  Constitutional:       General: She is not in acute distress  HENT:      Right Ear: Tympanic membrane normal       Left Ear: Tympanic membrane normal       Nose: No rhinorrhea  Mouth/Throat:      Pharynx: Posterior oropharyngeal erythema present  Tonsils: Tonsillar exudate present  3+ on the right  2+ on the left  Cardiovascular:      Heart sounds: Normal heart sounds  Pulmonary:      Effort: No respiratory distress  Breath sounds: Normal breath sounds  Musculoskeletal:      Cervical back: Normal range of motion     Lymphadenopathy:      Cervical: Cervical adenopathy present  Neurological:      Mental Status: She is alert  Bed in lowest position, wheels locked, appropriate side rails in place/Call bell, personal items and telephone in reach/Instruct patient to call for assistance before getting out of bed or chair/Non-slip footwear when patient is out of bed/[place] to call system/Physically safe environment - no spills, clutter or unnecessary equipment/Purposeful Proactive Rounding/Room/bathroom lighting operational, light cord in reach

## 2024-06-10 NOTE — PROGRESS NOTES
Assessment & Plan   Problem List Items Addressed This Visit    None  Visit Diagnoses       Well woman exam    -  Primary    Irregular menses        Relevant Orders    Follicle stimulating hormone    Luteinizing hormone    Testosterone, free, total    Insulin, fasting    DHEA-sulfate    Estradiol    Progesterone    Hemoglobin A1C    Ambulatory Referral to Endocrinology    Weight gain        Relevant Orders    Ambulatory Referral to Endocrinology            Discussion  I have discussed the importance of monthly self-breast exams, exercise and healthy diet.    Encourage safe sexual practices; STI testing - declines  Contraception - none    The current ASCCP guidelines were reviewed. Patient's last pap was 2023 and therefore, a pap with HPV cotesting is not indicated at this time.    Breast cancer screening is not indicated at this time    Blood work ordered for hormone evaluation and to rule in/out PCOS. Endocrine referral placed to review cortisol testing and evaluation.     All questions have been answered to her satisfaction  RTO for APE or sooner if needed      Subjective     HPI   Norah Amador is a 38 y.o. female who presents for annual well woman exam.     LMP - 05/10/24; Periods are irregular, with skipped months,  and last 4 days; No excessive bleeding; No intermenstrual bleeding or spotting; Cramps are tolerable.    +vaginal odor. No vulvar itch/burn; No vaginal itch/burn; No abn discharge.     No urinary sx - burning/pain/frequency/hematuria    (+) SBEs - no breast masses, asymmetry, nipple discharge or bleeding, changes in skin of breast, or breast tenderness bilaterally.     +left pelvic pain, around time of menses.    She reports hot flashes, night sweats, moodiness, and she is concerned for PCOS, cortisol levels, and perimenopause. +hirtuisum, +acanthosis nigricans. She reports she is pre-diabetic and reports recent weight gain.     Pt is not currently sexually active. Feels safe at home      (+) PCP  for routine Bw/care;        Review of Systems   Constitutional:  Positive for unexpected weight change.   Gastrointestinal:  Negative for abdominal pain and constipation.   Genitourinary:  Positive for menstrual problem and vaginal bleeding. Negative for dysuria, flank pain, frequency, urgency, vaginal discharge and vaginal pain.       The following portions of the patient's history were reviewed and updated as appropriate: allergies, current medications, past family history, past medical history, past social history, past surgical history, and problem list.         OB History          2    Para   2    Term   2            AB        Living   2         SAB        IAB        Ectopic        Multiple        Live Births   2                 Past Medical History:   Diagnosis Date    Prediabetes        Past Surgical History:   Procedure Laterality Date     SECTION      MOUTH SURGERY         Family History   Problem Relation Age of Onset    Breast cancer Family     Diabetes Mother     Heart disease Father        Social History     Socioeconomic History    Marital status: Single     Spouse name: Not on file    Number of children: Not on file    Years of education: Not on file    Highest education level: Not on file   Occupational History    Not on file   Tobacco Use    Smoking status: Never    Smokeless tobacco: Never   Vaping Use    Vaping status: Never Used   Substance and Sexual Activity    Alcohol use: Yes     Comment: socially    Drug use: Never    Sexual activity: Not Currently   Other Topics Concern    Not on file   Social History Narrative    Caffeine use     Social Determinants of Health     Financial Resource Strain: Not on file   Food Insecurity: Not on file   Transportation Needs: Not on file   Physical Activity: Not on file   Stress: Not on file   Social Connections: Not on file   Intimate Partner Violence: Not on file   Housing Stability: Not on file         Current Outpatient  "Medications:     B Complex Vitamins (B COMPLEX 1 PO), Take 1 tablet by mouth daily, Disp: , Rfl:     Barberry-Oreg Grape-Goldenseal (BERBERINE COMPLEX PO), Take by mouth, Disp: , Rfl:     Cholecalciferol 50 MCG (2000 UT) CAPS, Take 1 capsule by mouth daily, Disp: , Rfl:     magnesium 30 MG tablet, Take 30 mg by mouth 2 (two) times a day, Disp: , Rfl:     Turmeric 400 MG CAPS, Take by mouth daily, Disp: , Rfl:     zinc gluconate 50 mg tablet, Take 50 mg by mouth daily, Disp: , Rfl:     BinaxNOW COVID-19 Ag Home Test KIT, REFER TO  INSTUCTIONS ON PACKAGING (Patient not taking: Reported on 3/27/2023), Disp: , Rfl:     ibuprofen (MOTRIN) 600 mg tablet, Take 1 tablet (600 mg total) by mouth every 6 (six) hours as needed for mild pain or moderate pain for up to 5 days, Disp: 20 tablet, Rfl: 0    naproxen (NAPROSYN) 500 mg tablet, Take 1 tablet (500 mg total) by mouth 2 (two) times a day with meals for 10 days, Disp: 20 tablet, Rfl: 0    No Known Allergies    Objective   Vitals:    06/11/24 0942   BP: 152/86   BP Location: Left arm   Patient Position: Sitting   Cuff Size: Large   Weight: 106 kg (234 lb)   Height: 5' 6\" (1.676 m)     Physical Exam  Vitals and nursing note reviewed.   Constitutional:       Appearance: Normal appearance. She is well-developed and normal weight.   HENT:      Head: Normocephalic and atraumatic.   Eyes:      Conjunctiva/sclera: Conjunctivae normal.   Cardiovascular:      Rate and Rhythm: Normal rate and regular rhythm.      Heart sounds: Normal heart sounds.   Pulmonary:      Effort: Pulmonary effort is normal.      Breath sounds: Normal breath sounds.   Chest:   Breasts:     Breasts are symmetrical.      Right: Normal. No inverted nipple, mass, nipple discharge, skin change or tenderness.      Left: Normal. No inverted nipple, mass, nipple discharge, skin change or tenderness.   Abdominal:      General: Abdomen is flat. There is no distension.      Palpations: Abdomen is soft. There " is no mass.      Tenderness: There is no abdominal tenderness. There is no right CVA tenderness or left CVA tenderness.   Genitourinary:     General: Normal vulva.      Exam position: Lithotomy position.      Pubic Area: No rash or pubic lice.       Labia:         Right: No rash or tenderness.         Left: No rash or tenderness.       Urethra: No urethral pain.      Vagina: Bleeding present. No vaginal discharge.      Cervix: Normal.      Uterus: Normal. No uterine prolapse.       Adnexa: Right adnexa normal and left adnexa normal.        Right: No mass or tenderness.          Left: No mass or tenderness.        Comments: Active menses.  Musculoskeletal:         General: No tenderness. Normal range of motion.      Cervical back: Normal range of motion. No tenderness.      Right lower leg: No edema.      Left lower leg: No edema.   Lymphadenopathy:      Cervical: No cervical adenopathy.      Upper Body:      Right upper body: No supraclavicular or axillary adenopathy.      Left upper body: No supraclavicular or axillary adenopathy.      Lower Body: No right inguinal adenopathy. No left inguinal adenopathy.   Skin:     General: Skin is warm and dry.   Neurological:      Mental Status: She is alert and oriented to person, place, and time.      Motor: No weakness.   Psychiatric:         Mood and Affect: Mood normal.         Behavior: Behavior normal.         Thought Content: Thought content normal.         Judgment: Judgment normal.         There are no Patient Instructions on file for this visit.

## 2024-06-11 ENCOUNTER — ANNUAL EXAM (OUTPATIENT)
Dept: OBGYN CLINIC | Facility: CLINIC | Age: 39
End: 2024-06-11
Payer: COMMERCIAL

## 2024-06-11 VITALS
DIASTOLIC BLOOD PRESSURE: 86 MMHG | WEIGHT: 234 LBS | HEIGHT: 66 IN | BODY MASS INDEX: 37.61 KG/M2 | SYSTOLIC BLOOD PRESSURE: 152 MMHG

## 2024-06-11 DIAGNOSIS — N92.6 IRREGULAR MENSES: ICD-10-CM

## 2024-06-11 DIAGNOSIS — Z01.419 WELL WOMAN EXAM: Primary | ICD-10-CM

## 2024-06-11 DIAGNOSIS — R63.5 WEIGHT GAIN: ICD-10-CM

## 2024-06-11 PROCEDURE — S0612 ANNUAL GYNECOLOGICAL EXAMINA: HCPCS

## 2024-06-11 RX ORDER — TURMERIC 400 MG
CAPSULE ORAL DAILY
COMMUNITY

## 2024-06-11 RX ORDER — ZINC GLUCONATE 50 MG
50 TABLET ORAL DAILY
COMMUNITY

## 2024-06-11 RX ORDER — MAGNESIUM 30 MG
30 TABLET ORAL 2 TIMES DAILY
COMMUNITY

## 2024-06-11 NOTE — PATIENT INSTRUCTIONS
Supplements:   B12 1000 mcg  Folic Acid 800 mcg   Collagen with vitamin C powder  Ashwagandha   Vitamin D 2000 iu  Omega 3 1250 mg ( 2 daily)  CoQ10 200 mg  Magnesium glycinate 500 mg at bedtime

## 2024-06-14 ENCOUNTER — APPOINTMENT (OUTPATIENT)
Dept: LAB | Facility: HOSPITAL | Age: 39
End: 2024-06-14
Payer: COMMERCIAL

## 2024-06-14 DIAGNOSIS — N92.6 IRREGULAR MENSES: ICD-10-CM

## 2024-06-14 LAB
EST. AVERAGE GLUCOSE BLD GHB EST-MCNC: 131 MG/DL
ESTRADIOL SERPL-MCNC: 36.5 PG/ML
FSH SERPL-ACNC: 7.6 MIU/ML
HBA1C MFR BLD: 6.2 %
INSULIN SERPL-ACNC: 29.76 UIU/ML (ref 1.9–23)
LH SERPL-ACNC: 3 MIU/ML
PROGEST SERPL-MCNC: 0.47 NG/ML

## 2024-06-14 PROCEDURE — 83036 HEMOGLOBIN GLYCOSYLATED A1C: CPT

## 2024-06-14 PROCEDURE — 36415 COLL VENOUS BLD VENIPUNCTURE: CPT

## 2024-06-14 PROCEDURE — 84144 ASSAY OF PROGESTERONE: CPT

## 2024-06-14 PROCEDURE — 83001 ASSAY OF GONADOTROPIN (FSH): CPT

## 2024-06-14 PROCEDURE — 83525 ASSAY OF INSULIN: CPT

## 2024-06-14 PROCEDURE — 82670 ASSAY OF TOTAL ESTRADIOL: CPT

## 2024-06-14 PROCEDURE — 84403 ASSAY OF TOTAL TESTOSTERONE: CPT

## 2024-06-14 PROCEDURE — 83002 ASSAY OF GONADOTROPIN (LH): CPT

## 2024-06-14 PROCEDURE — 84402 ASSAY OF FREE TESTOSTERONE: CPT

## 2024-06-14 PROCEDURE — 82627 DEHYDROEPIANDROSTERONE: CPT

## 2024-06-15 LAB
DHEA-S SERPL-MCNC: 327 UG/DL (ref 57.3–279.2)
TESTOST FREE SERPL-MCNC: 3.5 PG/ML (ref 0–4.2)
TESTOST SERPL-MCNC: 42 NG/DL (ref 8–60)

## 2024-06-17 ENCOUNTER — TELEPHONE (OUTPATIENT)
Age: 39
End: 2024-06-17

## 2024-06-17 NOTE — TELEPHONE ENCOUNTER
Patient calling requesting a review of her abnormal lab results.  Will forward to ordering provider to review and recommend patient's next steps.

## 2024-06-18 ENCOUNTER — TELEPHONE (OUTPATIENT)
Dept: OBGYN CLINIC | Facility: CLINIC | Age: 39
End: 2024-06-18

## 2024-06-18 NOTE — TELEPHONE ENCOUNTER
Called patient to review blood work. Blood work and clinical presentation is indicative of PCOS. Counseled patient on diagnosis, pathophysiology, and symptom management, including treating insulin resistance and menstrual irregularity. Discussed supplements, metformin, and hormonal BC. Patient was interested and had questions regarding SLG-1; Ozempic and Trulicity. Reviewed that these medications would help with weight management and insulin resistance but she would need to discuss with PCP/endocrine. She plans to make an appt with endocrine.   She is unsure at this time regarding BC for menstrual regularity. Discussed risk of endometrial hyperplasia and to contact us if experiencing absent menses [id number] for proper management of withdrawal bleed. Patient agreeable. Will send [employer] message regarding f/u plan.

## 2024-07-15 ENCOUNTER — CONSULT (OUTPATIENT)
Dept: ENDOCRINOLOGY | Facility: CLINIC | Age: 39
End: 2024-07-15
Payer: COMMERCIAL

## 2024-07-15 VITALS
SYSTOLIC BLOOD PRESSURE: 120 MMHG | WEIGHT: 234.4 LBS | BODY MASS INDEX: 37.67 KG/M2 | OXYGEN SATURATION: 99 % | HEIGHT: 66 IN | HEART RATE: 70 BPM | DIASTOLIC BLOOD PRESSURE: 88 MMHG

## 2024-07-15 DIAGNOSIS — R63.5 WEIGHT GAIN: ICD-10-CM

## 2024-07-15 DIAGNOSIS — E55.9 VITAMIN D DEFICIENCY: ICD-10-CM

## 2024-07-15 DIAGNOSIS — N92.6 IRREGULAR MENSES: Primary | ICD-10-CM

## 2024-07-15 DIAGNOSIS — R79.89 ELEVATED DEHYDROEPIANDROSTERONE (DHEA) LEVEL: ICD-10-CM

## 2024-07-15 PROBLEM — B34.9 VIRAL INFECTION, UNSPECIFIED: Status: RESOLVED | Noted: 2022-11-28 | Resolved: 2024-07-15

## 2024-07-15 PROBLEM — R73.03 PREDIABETES: Status: ACTIVE | Noted: 2024-07-15

## 2024-07-15 PROCEDURE — 99204 OFFICE O/P NEW MOD 45 MIN: CPT | Performed by: INTERNAL MEDICINE

## 2024-07-15 NOTE — PROGRESS NOTES
Norah Amador 38 y.o. female MRN: [id number]    Encounter: [id number]      Assessment & Plan     Assessment:  This is a 38 y.o.-year-old female with prediabetes, hyperlipidemia, obesity, recent PCOS diagnosis, presenting evaluation for weight gain and irregular menses.  Recent lab work with mildly elevated DHEA-S, normal testosterone.    It is not currently clear if patient does have PCOS, which is a diagnosis of exclusion.  Has also not met 2/3 [place] criteria. While she does have longstanding irregular periods, the testosterone is not elevated and she does not have significant hyperandrogen symptoms like hirsutism or acne.  The insulin resistance, weight gain, and prediabetes may be independent as patient does have significant family history thereof and does have contributory behavioral patterns.    Will evaluate patient for congenital adrenal hyperplasia.  There does appear to be a family history of menstrual irregularities in both patient's mother and daughter.    Plan:  --ACTH stimulation test to rule out congenital adrenal hyperplasia scheduled for 7/29  --Will be in touch with patient to discuss results when all of labs return, which may take several weeks  --Could consider screening for cortisol excess after ACTH stimulation test  --Will repeat vitamin D level as has previously been quite low  --Patient is interested in a GLP-1 agonist like Wegovy.  Her coworker is on one and she is hopeful that her insurance would cover it as well.  Discussed that she is not a candidate for the diabetes-branded GLP-1 medications like Ozempic, Mounjaro, Trulicity but that we could refer her to weight management for further evaluation for Wegovy or Zepbound after hormonal workup is complete.  Patient declines weight management referral at this time, is not interested in nutritional evaluation, but does remain interested in a GLP-1 agonist in the future  --Discussed increasing activity levels and portion management as  "baseline management of weight gain  -- 8-week follow-up in fellow clinic    CC: Weight gain, irregular menses    History of Present Illness     HPI:  38 y.o. female with past medical history significant for prediabetes, hyperlipidemia, obesity, recently diagnosed with PCOS by GYN, presenting in evaluation for weight gain and irregular menses.  Interested in GLP-1 agonist.  Was offered metformin by GYN but does not think she would be very good at a daily pill.    Weight gain started w stress w bad breakup late 20s, has had acanthosis nigricans and skin tags for years now.  Diabetic at least 5 years.  Has 2 kids, age 10 and 18, does not recall difficulties w conception, did have irregular periods back then too, the 17yo was just dx w PCOS  Weight today 234, highest ever, frustrated.  6/2022 211, 2020 210, 2019 206, 2018 207    6/14/2024 DHEA-S 327, LH 3, FSH 7.6, A1c 6.2%, estradiol 36.5, progesterone 0.47, testosterone 42, free testosterone 3.5, insulin 29.76,    8/8/2023 TSH 2.4 Free T40.8  9/2021 TSH 1.99 Free T40.9    Br: SEC wrap OR bagel AND coffee cream sugar   S: chips/cookie bc \"sugar goes down\" and starts shaking   L: sandwich , sub  S: no  D: meat, veg, starch  S: random sugary snack   Does take walks, exercises limited by knee pain    Review of Systems   Constitutional:  Positive for appetite change (more carb cravings, mona last 10 y), fatigue and unexpected weight change (gain w stress years ago). Negative for activity change.        +hard to do exercise, tired, sore   Genitourinary:         +irregular periods , has skipped 6m before, usually every other month  -not heavy   +night sweats in past year , most nights  -low libido    Musculoskeletal:  Positive for arthralgias (mona knees). Negative for myalgias.   Skin:         +skin tags    Psychiatric/Behavioral:  Positive for sleep disturbance (6h usually , some trouble falling,staying asleep).        Historical Information   Past Medical History: " "  Diagnosis Date    PCOS (polycystic ovarian syndrome) 2024    Prediabetes      Past Surgical History:   Procedure Laterality Date     SECTION      MOUTH SURGERY       Social History   Social History     Substance and Sexual Activity   Alcohol Use Yes    Comment: socially     Social History     Substance and Sexual Activity   Drug Use Never     Social History     Tobacco Use   Smoking Status Never   Smokeless Tobacco Never     Family History:   Family History   Problem Relation Age of Onset    Breast cancer Family     Diabetes Mother     Heart disease Father        Meds/Allergies   Current Outpatient Medications   Medication Sig Dispense Refill    B Complex Vitamins (B COMPLEX 1 PO) Take 1 tablet by mouth daily Occasionally      Barberry-Oreg Grape-Goldenseal (BERBERINE COMPLEX PO) Take by mouth      Cholecalciferol 50 MCG ( UT) CAPS Take 1 capsule by mouth daily      ibuprofen (MOTRIN) 600 mg tablet Take 1 tablet (600 mg total) by mouth every 6 (six) hours as needed for mild pain or moderate pain for up to 5 days 20 tablet 0    magnesium 30 MG tablet Take 30 mg by mouth 2 (two) times a day      naproxen (NAPROSYN) 500 mg tablet Take 1 tablet (500 mg total) by mouth 2 (two) times a day with meals for 10 days 20 tablet 0    Turmeric 400 MG CAPS Take by mouth daily      zinc gluconate 50 mg tablet Take 50 mg by mouth daily       No current facility-administered medications for this visit.     No Known Allergies    Objective   Vitals: Blood pressure 120/88, pulse 70, height 5' 6\" (1.676 m), weight 106 kg (234 lb 6.4 oz), SpO2 99%.    Physical Exam  Constitutional:       General: She is not in acute distress.     Appearance: Normal appearance. She is obese. She is not ill-appearing, toxic-appearing or diaphoretic.   HENT:      Head: Normocephalic and atraumatic.      Nose: Nose normal.   Eyes:      Extraocular Movements: Extraocular movements intact.      Conjunctiva/sclera: Conjunctivae normal.      " "Pupils: Pupils are equal, round, and reactive to light.   Cardiovascular:      Rate and Rhythm: Normal rate and regular rhythm.   Pulmonary:      Effort: Pulmonary effort is normal. No respiratory distress.      Breath sounds: Normal breath sounds. No stridor. No wheezing, rhonchi or rales.   Abdominal:      General: There is no distension.   Musculoskeletal:         General: No deformity.      Right lower leg: No edema.      Left lower leg: No edema.   Skin:     General: Skin is warm and dry.      Comments: + Skin tags  + Acanthosis nigricans   Neurological:      General: No focal deficit present.      Mental Status: She is alert. Mental status is at baseline.   Psychiatric:         Mood and Affect: Mood normal.         Behavior: Behavior normal.         Thought Content: Thought content normal.         The history was obtained from the review of the chart, patient.    Lab Results:   Lab Results   Component Value Date/Time    TSH 3RD GENERATON 2.419 08/08/2023 07:48 AM    Free T4 0.82 08/08/2023 07:48 AM       Imaging Studies:       I have personally reviewed pertinent reports.      Portions of the record may have been created with voice recognition software. Occasional wrong word or \"sound a like\" substitutions may have occurred due to the inherent limitations of voice recognition software. Read the chart carefully and recognize, using context, where substitutions have occurred.    "

## 2024-07-15 NOTE — PATIENT INSTRUCTIONS
-- Do ACTH stimulation test to rule out congenital adrenal hyperplasia  --This will give us more information as to whether you have PCOS or not  --Would hold off on any medical therapy like Wegovy until this evaluation is complete, then would likely refer to weight management for the medication evaluation

## 2024-07-16 ENCOUNTER — TELEPHONE (OUTPATIENT)
Dept: FAMILY MEDICINE CLINIC | Facility: CLINIC | Age: 39
End: 2024-07-16

## 2024-07-16 ENCOUNTER — TELEPHONE (OUTPATIENT)
Age: 39
End: 2024-07-16

## 2024-07-16 DIAGNOSIS — E66.9 OBESITY (BMI 30-39.9): Primary | ICD-10-CM

## 2024-07-25 DIAGNOSIS — N92.6 IRREGULAR MENSES: Primary | ICD-10-CM

## 2024-07-25 DIAGNOSIS — R79.89 ELEVATED DEHYDROEPIANDROSTERONE (DHEA) LEVEL: ICD-10-CM

## 2024-07-29 ENCOUNTER — HOSPITAL ENCOUNTER (OUTPATIENT)
Dept: INFUSION CENTER | Facility: HOSPITAL | Age: 39
Discharge: HOME | End: 2024-07-29
Payer: COMMERCIAL

## 2024-07-29 VITALS
SYSTOLIC BLOOD PRESSURE: 134 MMHG | DIASTOLIC BLOOD PRESSURE: 84 MMHG | TEMPERATURE: 98.6 F | RESPIRATION RATE: 20 BRPM | HEART RATE: 82 BPM

## 2024-07-29 DIAGNOSIS — R79.89 ELEVATED DEHYDROEPIANDROSTERONE (DHEA) LEVEL: ICD-10-CM

## 2024-07-29 DIAGNOSIS — E55.9 VITAMIN D DEFICIENCY: ICD-10-CM

## 2024-07-29 DIAGNOSIS — N92.6 IRREGULAR MENSES: Primary | ICD-10-CM

## 2024-07-29 LAB
25(OH)D3 SERPL-MCNC: 13.8 NG/ML (ref 30–100)
CORTIS SERPL-MCNC: 19.1 UG/DL
CORTIS SERPL-MCNC: 8.3 UG/DL
CORTIS SERPL-MCNC: 8.3 UG/DL

## 2024-07-29 PROCEDURE — 82634 DEOXYCORTISOL: CPT | Performed by: STUDENT IN AN ORGANIZED HEALTH CARE EDUCATION/TRAINING PROGRAM

## 2024-07-29 PROCEDURE — 82627 DEHYDROEPIANDROSTERONE: CPT | Performed by: STUDENT IN AN ORGANIZED HEALTH CARE EDUCATION/TRAINING PROGRAM

## 2024-07-29 PROCEDURE — 83498 ASY HYDROXYPROGESTERONE 17-D: CPT | Performed by: STUDENT IN AN ORGANIZED HEALTH CARE EDUCATION/TRAINING PROGRAM

## 2024-07-29 PROCEDURE — 82157 ASSAY OF ANDROSTENEDIONE: CPT | Performed by: STUDENT IN AN ORGANIZED HEALTH CARE EDUCATION/TRAINING PROGRAM

## 2024-07-29 PROCEDURE — 84143 ASSAY OF 17-HYDROXYPREGNENO: CPT | Performed by: STUDENT IN AN ORGANIZED HEALTH CARE EDUCATION/TRAINING PROGRAM

## 2024-07-29 PROCEDURE — 82533 TOTAL CORTISOL: CPT | Performed by: STUDENT IN AN ORGANIZED HEALTH CARE EDUCATION/TRAINING PROGRAM

## 2024-07-29 PROCEDURE — 82626 DEHYDROEPIANDROSTERONE: CPT | Performed by: STUDENT IN AN ORGANIZED HEALTH CARE EDUCATION/TRAINING PROGRAM

## 2024-07-29 PROCEDURE — 82306 VITAMIN D 25 HYDROXY: CPT

## 2024-07-29 RX ADMIN — SODIUM CHLORIDE 0.25 MG: 9 INJECTION INTRAMUSCULAR; INTRAVENOUS; SUBCUTANEOUS at 08:41

## 2024-07-29 NOTE — PROGRESS NOTES
Pt tolerated Cortisol stim test with out complications. All labs drawn and sent as ordered. Pt left unit ambulatory with a steady gait.

## 2024-07-30 LAB
DHEA-S SERPL-MCNC: 250 UG/DL (ref 57.3–279.2)
DHEA-S SERPL-MCNC: 346 UG/DL (ref 57.3–279.2)
DHEA-S SERPL-MCNC: 386 UG/DL (ref 57.3–279.2)

## 2024-07-31 LAB
ANDROST SERPL-MCNC: 141 NG/DL (ref 41–262)
ANDROST SERPL-MCNC: 207 NG/DL (ref 41–262)
ANDROST SERPL-MCNC: 208 NG/DL (ref 41–262)

## 2024-08-01 LAB
DHEA SERPL-MCNC: 1317 NG/DL (ref 31–701)
DHEA SERPL-MCNC: 471 NG/DL (ref 31–701)

## 2024-08-02 LAB — DHEA SERPL-MCNC: 1490 NG/DL (ref 31–701)

## 2024-08-05 LAB
17OHP SERPL-MCNC: 108 NG/DL
17OHP SERPL-MCNC: 125 NG/DL
17OHP SERPL-MCNC: 31 NG/DL

## 2024-08-06 LAB
11DC SERPL-MCNC: 0.02 UG/DL
11DC SERPL-MCNC: 0.09 UG/DL
11DC SERPL-MCNC: 0.09 UG/DL
17OH-PREG SERPL-MCNC: 731 NG/DL
17OH-PREG SERPL-MCNC: 780 NG/DL
17OH-PREG SERPL-MCNC: 91 NG/DL

## 2024-08-23 ENCOUNTER — OFFICE VISIT (OUTPATIENT)
Dept: BARIATRICS | Facility: CLINIC | Age: 39
End: 2024-08-23
Payer: COMMERCIAL

## 2024-08-23 VITALS
TEMPERATURE: 98.2 F | RESPIRATION RATE: 16 BRPM | HEART RATE: 82 BPM | WEIGHT: 233.6 LBS | SYSTOLIC BLOOD PRESSURE: 140 MMHG | DIASTOLIC BLOOD PRESSURE: 78 MMHG | HEIGHT: 66 IN | BODY MASS INDEX: 37.54 KG/M2

## 2024-08-23 DIAGNOSIS — E66.9 OBESITY (BMI 30-39.9): Primary | ICD-10-CM

## 2024-08-23 DIAGNOSIS — R73.03 PREDIABETES: ICD-10-CM

## 2024-08-23 DIAGNOSIS — E78.5 DYSLIPIDEMIA: ICD-10-CM

## 2024-08-23 DIAGNOSIS — E55.9 VITAMIN D DEFICIENCY: ICD-10-CM

## 2024-08-23 PROCEDURE — 99203 OFFICE O/P NEW LOW 30 MIN: CPT | Performed by: INTERNAL MEDICINE

## 2024-08-23 RX ORDER — TIRZEPATIDE 2.5 MG/.5ML
2.5 INJECTION, SOLUTION SUBCUTANEOUS WEEKLY
Qty: 2 ML | Refills: 0 | Status: SHIPPED | OUTPATIENT
Start: 2024-08-23 | End: 2024-09-20

## 2024-08-23 RX ORDER — ERGOCALCIFEROL 1.25 MG/1
50000 CAPSULE, LIQUID FILLED ORAL WEEKLY
Qty: 12 CAPSULE | Refills: 0 | Status: SHIPPED | OUTPATIENT
Start: 2024-08-23

## 2024-08-23 NOTE — PATIENT INSTRUCTIONS
- Discussed options of HealthyCORE-Intensive Lifestyle Intervention Program, Very Low Calorie Diet-VLCD, Conservative Program, Eric-En-Y Gastric Bypass, and Vertical Sleeve Gastrectomy and the role of weight loss medications.  - Explained the importance of making lifestyle changes first before starting anti-obesity medications.  - Patient should demonstrate lifestyle changes first before anti-obesity medication initiated.   - Patient is interested in pursuing Conservative Program  - Initial weight loss goal of 5-10% weight loss for improved health as studies have shown this is where we see the greatest impact on improving health and decreasing risk of obesity related conditions.  - Weight loss can improve patient's co-morbid conditions and/or prevent weight-related complications.  - Labs reviewed: As below.      General Recommendations:  Nutrition:  Eat breakfast daily.  Do not skip meals.     Food log (ie.) www.TabTale.com, sparkpeople.com, loseit.com, calorieking.com, etc.    Practice mindful eating.  Be sure to set aside time to eat, eat slowly, and savor your food.    Hydration:    At least 64oz of water daily.  No sugar sweetened beverages.  No juice (eat the fruit instead).    Exercise:  Studies have shown that the ideal exercise goal is somewhere between 150 to 300 minutes of moderate intensity exercise a week.  Start with exercising 10 minutes every other day and gradually increase physical activity with a goal of at least 150 minutes of moderate intensity exercise a week, divided over at least 3 days a week.  An example of this would be exercising 30 minutes a day, 5 days a week.  Resistance training can increase muscle mass and increase our resting metabolic rate.   FULL BODY resistance training is recommended 2-3 times a week.  Do not do this on consecutive days to allow for muscle recovery.    Aim for a bare minimum 5000 steps, even on days you do not exercise.    Monitoring:   Weigh yourself daily.   If this causes undue stress, then just weigh yourself once a week.  Weigh yourself the same time of the day with the same amount of clothing on.  Preferably this should be done after waking up, before you eat, and with no clothing or minimal clothing on.    Specific Goals:  Food log (ie.) www.WIDIPpal.com,VoulezVousDiner.com,loseit.com,Wylio.com,etc.   No sugary beverages. At least 64oz of water daily.  Gradually increase physical activity to a goal of 5 days per week for 30 minutes of MODERATE intensity PLUS 2 days per week of FULL BODY resistance training    Calorie goal:  1600 calories a day (Provided with meal plan to follow).    START ZEPBOUND  I have sent Zebound to your pharmacy. The prior authorization process will been done through our prior authorization team and can take up to 3 weeks to process through the insurance.      - Start Zepbound 2.5 mg subcutaneously injection weekly.  You will need a nurse visit in 4 weeks.  If you are doing well at that time the dose will be increased to Zepbound 5mg weekly.     - Side effects of Zepbound include nausea, vomiting, diarrhea, or constipation. Keep an eye on your heart rate while on Zepbound. If you resting heart rate is greater than 100 beats per minutes, please notify me. If you develop severe abdominal pain, stop Zepbound and go to the emergency room, as that could be a sign of pancreatitis.      - Please notify me if you have surgery, upper endoscopy, or colonoscopy scheduled, as we typically hold Zepbound for one week prior to the procedure.   - Zepbound can reduce the effectiveness of oral hormonal birth control (birth control pills). Recommend a barrier backup method such as condoms to prevent pregnancy.       Nurse visit in 1 month.  If you are doing fine at that point the dose will be increased to 5mg weekly.  Ask for a follow-up appointment in 2-3 months at that time.

## 2024-08-23 NOTE — PROGRESS NOTES
Assessment/Plan:  Norah was seen today for consult.    Diagnoses and all orders for this visit:    Obesity (BMI 30-39.9)  -     Ambulatory Referral to Weight Management  -     tirzepatide (Zepbound) 2.5 mg/0.5 mL auto-injector; Inject 0.5 mL (2.5 mg total) under the skin once a week for 28 days    Prediabetes    Dyslipidemia    Vitamin D deficiency  -     ergocalciferol (VITAMIN D2) 50,000 units; Take 1 capsule (50,000 Units total) by mouth once a week       - Discussed options of Healthy[facility]-Intensive Lifestyle Intervention Program, Very Low Calorie Diet-VLCD, Conservative Program, Eric-En-Y Gastric Bypass, and Vertical Sleeve Gastrectomy and the role of weight loss medications.  - Explained the importance of making lifestyle changes first before starting anti-obesity medications.  - Patient should demonstrate lifestyle changes first before anti-obesity medication initiated.   - Patient is interested in pursuing Conservative Program  - Initial weight loss goal of 5-10% weight loss for improved health as studies have shown this is where we see the greatest impact on improving health and decreasing risk of obesity related conditions.  - Weight loss can improve patient's co-morbid conditions and/or prevent weight-related complications.  - Labs reviewed: As below.      General Recommendations:  Nutrition:  Eat breakfast daily.  Do not skip meals.     Food log (ie.) www.maniaTV.com, sparkpeople.com, loseit.com, calorieking.com, etc.    Practice mindful eating.  Be sure to set aside time to eat, eat slowly, and savor your food.    Hydration:    At least 64oz of water daily.  No sugar sweetened beverages.  No juice (eat the fruit instead).    Exercise:  Studies have shown that the ideal exercise goal is somewhere between 150 to 300 minutes of moderate intensity exercise a week.  Start with exercising 10 minutes every other day and gradually increase physical activity with a goal of at least 150 minutes of moderate  intensity exercise a week, divided over at least 3 days a week.  An example of this would be exercising 30 minutes a day, 5 days a week.  Resistance training can increase muscle mass and increase our resting metabolic rate.   FULL BODY resistance training is recommended 2-3 times a week.  Do not do this on consecutive days to allow for muscle recovery.    Aim for a bare minimum 5000 steps, even on days you do not exercise.    Monitoring:   Weigh yourself daily.  If this causes undue stress, then just weigh yourself once a week.  Weigh yourself the same time of the day with the same amount of clothing on.  Preferably this should be done after waking up, before you eat, and with no clothing or minimal clothing on.    Specific Goals:  Food log (ie.) www.[employer].com,sparkpeople.com,SheZoomit.com,LevelUp.com,etc.   No sugary beverages. At least 64oz of water daily.  Gradually increase physical activity to a goal of 5 days per week for 30 minutes of MODERATE intensity PLUS 2 days per week of FULL BODY resistance training    Calorie goal:  1600 calories a day (Provided with meal plan to follow).    START ZEPBOUND  I have sent Zebound to your pharmacy. The prior authorization process will been done through our prior authorization team and can take up to 3 weeks to process through the insurance.      - Start Zepbound 2.5 mg subcutaneously injection weekly.  You will need a nurse visit in 4 weeks.  If you are doing well at that time the dose will be increased to Zepbound 5mg weekly.     - Side effects of Zepbound include nausea, vomiting, diarrhea, or constipation. Keep an eye on your heart rate while on Zepbound. If you resting heart rate is greater than 100 beats per minutes, please notify me. If you develop severe abdominal pain, stop Zepbound and go to the emergency room, as that could be a sign of pancreatitis.      - Please notify me if you have surgery, upper endoscopy, or colonoscopy scheduled, as we typically  "hold Zepbound for one week prior to the procedure.   - Zepbound can reduce the effectiveness of oral hormonal birth control (birth control pills). Recommend a barrier backup method such as condoms to prevent pregnancy.       Nurse visit in 1 month.  If you are doing fine at that point the dose will be increased to 5mg weekly.  Ask for a follow-up appointment in 2-3 months at that time.      Total time spent reviewing chart, interviewing patient, examining patient, discussing plan, answering all questions, and documentin min.       ______________________________________________________________________    Subjective:   Chief Complaint   Patient presents with    Consult     MWM- Consult; GW 180lb; Waist 43.5in- Stop Bang 3/8     HPI: Norah Amador  is a 38 y.o. female with history of prediabetes, dyslipidemia, vitamin D deficiency, and excess weight, here to pursue weight loss management.  Previous notes and records have been reviewed.    HPI  Wt Readings from Last 20 Encounters:   24 106 kg (233 lb 9.6 oz)   07/15/24 106 kg (234 lb 6.4 oz)   24 106 kg (234 lb)   23 101 kg (223 lb 6.4 oz)   23 102 kg (224 lb 6.4 oz)   22 95.7 kg (211 lb)   22 97.9 kg (215 lb 12.8 oz)   21 96.7 kg (213 lb 3.2 oz)   06/10/19 92.1 kg (203 lb)   19 91.7 kg (202 lb 3.2 oz)   19 93.6 kg (206 lb 6.4 oz)   13 74.8 kg (165 lb)     Excess Weight:  Onset:  5 year    Highest weight: 234  Current weight: 233  Goal: <200  What has been tried: Diet and Exercise  IF  Keto  RD  Contributing factors: Stress/Emotional Eating  Associated symptoms and effects: comorbid conditions    Hunger/Cravings: cravings to snack \"I feel like my blood sugar goes low\"  Dining out: every other day  Hydration:  Water 2-3 bottles    Coffee with flavored cream    2 sodas a week  Alcohol: 1 beer a week  Smoking: No  Exercise:  Walks 15 min during work break  Weight Monitoring:  No  Sleep:  6 hours  STOP-BANG " "Score: 3/8  Occupation:  SW    Past Medical History:   Diagnosis Date    PCOS (polycystic ovarian syndrome) 2024    Prediabetes      Patient denies personal and family history of  pancreatitis, thyroid cancer, MEN-2 tumors.  Denies any hx of glaucoma, seizures, kidney stones, gallstones.  Denies Hx of CAD, PAD, palpitations, arrhythmia.   Denies uncontrolled anxiety or depression, suicidal behavior or thinking , insomnia or sleep disturbance.     Past Surgical History:   Procedure Laterality Date     SECTION      MOUTH SURGERY       The following portions of the patient's history were reviewed and updated as appropriate: allergies, current medications, past family history, past medical history, past social history, past surgical history, and problem list.    Review Of Systems:  Review of Systems   Constitutional:  Negative for activity change, appetite change, fatigue and fever.   Respiratory:  Negative for cough and shortness of breath.    Cardiovascular:  Negative for chest pain, palpitations and leg swelling.   Gastrointestinal:  Negative for abdominal pain, constipation, diarrhea, nausea and vomiting.   Endocrine: Negative for cold intolerance and heat intolerance.   Genitourinary:  Negative for difficulty urinating and dysuria.   Musculoskeletal:  Negative for arthralgias, back pain and gait problem.   Skin:  Negative for pallor and rash.   Neurological:  Negative for headaches.   Psychiatric/Behavioral:  Negative for dysphoric mood, sleep disturbance and suicidal ideas (or HI). The patient is not nervous/anxious.      Objective:  /78 (BP Location: Left arm, Patient Position: Sitting)   Pulse 82   Temp 98.2 °F (36.8 °C) (Tympanic)   Resp 16   Ht 5' 6\" (1.676 m)   Wt 106 kg (233 lb 9.6 oz)   BMI 37.70 kg/m²   Physical Exam  Vitals and nursing note reviewed.   Constitutional:       General: She is not in acute distress.     Appearance: Normal appearance. She is not ill-appearing or " diaphoretic.   Eyes:      General: No scleral icterus.  Cardiovascular:      Rate and Rhythm: Normal rate and regular rhythm.      Pulses: Normal pulses.      Heart sounds: No murmur heard.  Pulmonary:      Effort: Pulmonary effort is normal. No respiratory distress.      Breath sounds: Normal breath sounds. No wheezing or rhonchi.   Abdominal:      General: Bowel sounds are normal. There is no distension.      Palpations: Abdomen is soft. There is no mass.      Tenderness: There is no abdominal tenderness.   Musculoskeletal:      Cervical back: Neck supple.      Right lower leg: No edema.      Left lower leg: No edema.   Lymphadenopathy:      Cervical: No cervical adenopathy.   Skin:     Capillary Refill: Capillary refill takes less than 2 seconds.      Findings: No lesion or rash.   Neurological:      Mental Status: She is alert and oriented to person, place, and time.      Gait: Gait normal.   Psychiatric:         Mood and Affect: Mood normal.         Behavior: Behavior normal.       Labs and Imaging  Recent labs and imaging have been personally reviewed.  Lab Results   Component Value Date    WBC 10.45 (H) 08/08/2023    HGB 12.2 08/08/2023    HCT 37.8 08/08/2023    MCV 96 08/08/2023     08/08/2023     Lab Results   Component Value Date    SODIUM 135 08/08/2023    K 3.9 08/08/2023     08/08/2023    CO2 27 08/08/2023    AGAP 4 08/08/2023    BUN 11 08/08/2023    CREATININE 0.72 08/08/2023    GLUC 93 03/12/2019    GLUC 93 03/12/2019    GLUF 97 08/08/2023    CALCIUM 8.5 08/08/2023    AST 15 08/08/2023    ALT 18 08/08/2023    ALKPHOS 85 08/08/2023    TP 6.9 08/08/2023    TBILI 0.40 08/08/2023    EGFR 106 08/08/2023     Lab Results   Component Value Date    HGBA1C 6.2 (H) 06/14/2024     Lab Results   Component Value Date    XLD1OREFDRZZ 2.419 08/08/2023    TSH 1.96 03/12/2019    TSH 1.96 03/12/2019     Lab Results   Component Value Date    CHOLESTEROL 174 08/08/2023     Lab Results   Component Value Date     HDL 44 (L) 08/08/2023     Lab Results   Component Value Date    TRIG 111 08/08/2023     Lab Results   Component Value Date    LDLCALC 108 (H) 08/08/2023

## 2024-08-26 ENCOUNTER — TELEPHONE (OUTPATIENT)
Dept: BARIATRICS | Facility: CLINIC | Age: 39
End: 2024-08-26

## 2024-08-26 NOTE — TELEPHONE ENCOUNTER
Pt called back with info requested.   Pt insurance: [employer] - Member ID [id number] - Group # [id number]    Medication Card - [employer]  Rx BIN # [id number]  Rx Group #   Issuer # [id number]  Member ID # [id number]  Rx PCN # [id number]

## 2024-08-28 ENCOUNTER — TELEPHONE (OUTPATIENT)
Dept: BARIATRICS | Facility: CLINIC | Age: 39
End: 2024-08-28

## 2024-08-28 NOTE — TELEPHONE ENCOUNTER
PA for Zepbound 2.5 mg APPROVED     Date(s) approved 8/28/24-8/28/25    Case #[id number]    Patient advised by          []MyChart Message  [x]Phone call   []LMOM  []L/M to call office as no active Communication consent on file  []Unable to leave detailed message as VM not approved on Communication consent       Pharmacy advised by    []Fax  [x]Phone call    Approval letter scanned into [employer] Yes

## 2024-08-28 NOTE — TELEPHONE ENCOUNTER
PA for Zepbound 2.5 mg SUBMITTED     via    [x]CMM-KEY: [id number]  [][employer]-Case ID #    []Faxed to plan   []Other website    []Phone call Case ID #      Office notes sent, clinical questions answered. Awaiting determination    Turnaround time for your insurance to make a decision on your Prior Authorization can take 7-21 business days.

## 2024-08-30 NOTE — TELEPHONE ENCOUNTER
Pt called in regarding to Zepbound prior-auth. Pt stated that called the pharmacy and they said they're been trying to contact the practice because it is something missing from our end. Pt was told the practice will contact the pharmacy. [facility] [place].

## 2024-09-03 NOTE — TELEPHONE ENCOUNTER
Called Pt Pharmacy and they just needed to  re run her script again for the PA their systems are down at the moment and will call pt when they are up and running.

## 2024-09-19 ENCOUNTER — TELEPHONE (OUTPATIENT)
Age: 39
End: 2024-09-19

## 2024-09-19 ENCOUNTER — TELEPHONE (OUTPATIENT)
Dept: BARIATRICS | Facility: CLINIC | Age: 39
End: 2024-09-19

## 2024-09-19 ENCOUNTER — OFFICE VISIT (OUTPATIENT)
Dept: ENDOCRINOLOGY | Facility: CLINIC | Age: 39
End: 2024-09-19
Payer: COMMERCIAL

## 2024-09-19 VITALS
DIASTOLIC BLOOD PRESSURE: 90 MMHG | BODY MASS INDEX: 37.86 KG/M2 | HEART RATE: 87 BPM | OXYGEN SATURATION: 98 % | HEIGHT: 66 IN | WEIGHT: 235.6 LBS | SYSTOLIC BLOOD PRESSURE: 138 MMHG

## 2024-09-19 DIAGNOSIS — E66.9 OBESITY (BMI 30-39.9): Primary | ICD-10-CM

## 2024-09-19 DIAGNOSIS — R73.03 PREDIABETES: ICD-10-CM

## 2024-09-19 DIAGNOSIS — E55.9 VITAMIN D DEFICIENCY: ICD-10-CM

## 2024-09-19 DIAGNOSIS — R63.5 WEIGHT GAIN: ICD-10-CM

## 2024-09-19 DIAGNOSIS — N92.6 IRREGULAR MENSES: Primary | ICD-10-CM

## 2024-09-19 PROCEDURE — 99214 OFFICE O/P EST MOD 30 MIN: CPT

## 2024-09-19 RX ORDER — ERGOCALCIFEROL 1.25 MG/1
50000 CAPSULE ORAL 3 TIMES WEEKLY
Qty: 20 CAPSULE | Refills: 0 | Status: SHIPPED | OUTPATIENT
Start: 2024-09-20 | End: 2024-11-05

## 2024-09-19 RX ORDER — TIRZEPATIDE 5 MG/.5ML
5 INJECTION, SOLUTION SUBCUTANEOUS WEEKLY
Qty: 2 ML | Refills: 1 | Status: SHIPPED | OUTPATIENT
Start: 2024-09-19 | End: 2024-11-14

## 2024-09-19 NOTE — TELEPHONE ENCOUNTER
AFUA- Was on the phone with [employer] and she approved for all dose, just not 2.5 mg anymore. She can't  Zepbound 5 mg until Sep 25th so she dose not need a new PA

## 2024-09-19 NOTE — ASSESSMENT & PLAN NOTE
Lab Results   Component Value Date    LFFY84ZBOIGS 13.8 (L) 07/29/2024   Completed 4 weeks of ergocalciferol 50,000 units weekly as prescribed by weight management  This dose may not be sufficient to increase patient's vitamin D levels to the recommended >30  Start ergocalciferol 52078DU three times weekly for 20 doses.   Follow up with primary care provider.

## 2024-09-19 NOTE — ASSESSMENT & PLAN NOTE
Endorses regular menses for the past 3 months, however are late this month. Denies increase in body hair, but has noted the presence of skin tags and acanthosis. Labs and available imaging reviewed.  Patient underwent cosyntropin stimulation test for the evaluation of NCCAH.   Results not consistent with CAH, no apparent adrenal source of patient's oligomenorrhea.  Irregular menses are likely in the setting of PCOS, although she does not meet [place] criteria at this time. No ovarian ultrasound on file.   Has started following with weight management  Recently started on Zepbound, 2.5 mg weekly x 4 weeks  Denies side effects from medication, no nausea or constipation.  Has not noted any weight loss, but has also not implemented lifestyle modifications.  Counseled patient on dietary modifications including reduction of carbohydrates in her diet and eating lean protein, vegetables and heart-healthy fats, as well as incorporating physical activity and if possible - strength training in order to improve insulin resistance.  Additional management per OB/GYN.   Follow up with endocrinology on an as needed basis.

## 2024-09-19 NOTE — TELEPHONE ENCOUNTER
Former patient of . Was started on Zepbound 2.5 mg on 8/23/24. Has Nurse follow up on 9/26/24. Will need refill prior to then. Is asking if she should go up to next dose? If so, she will need prescription sent to pharmacy. Noted that she did need a prior authorization for last prescription.Doing well. No side effects noted. Please advise

## 2024-09-19 NOTE — ASSESSMENT & PLAN NOTE
Lab Results   Component Value Date    HGBA1C 6.2 (H) 06/14/2024   Managed by primary care provider.   Is currently on GLP-1 agonist for weight loss currently.  Discussed implementation of lifestyle modifications including reducing the amount of carbohydrates in her diet and implementing strength training in her daily routine.   Provided patient with links to strength training videos requiring minimal intervention.

## 2024-09-19 NOTE — PATIENT INSTRUCTIONS
Start taking ergocalciferol (Vitamin D) 20324 units three times per week for 20 doses.   Afterwards start taking over the counter cholecalciferol (Vitamin D3) 97657 units daily.   Follow up with us as needed.   Please find below attached link to the exercise videos we discussed.     Full Version - [email or web address]  Warmup -[email or web address]  Lower Body -[email or web address]  Upper Body - [email or web address]  Core - [email or web address]

## 2024-09-26 ENCOUNTER — CLINICAL SUPPORT (OUTPATIENT)
Dept: BARIATRICS | Facility: CLINIC | Age: 39
End: 2024-09-26

## 2024-09-26 ENCOUNTER — TELEPHONE (OUTPATIENT)
Dept: BARIATRICS | Facility: CLINIC | Age: 39
End: 2024-09-26

## 2024-09-26 VITALS
SYSTOLIC BLOOD PRESSURE: 120 MMHG | RESPIRATION RATE: 16 BRPM | TEMPERATURE: 99.2 F | BODY MASS INDEX: 37.35 KG/M2 | DIASTOLIC BLOOD PRESSURE: 70 MMHG | HEIGHT: 66 IN | HEART RATE: 93 BPM | WEIGHT: 232.4 LBS

## 2024-09-26 DIAGNOSIS — R63.5 ABNORMAL WEIGHT GAIN: Primary | ICD-10-CM

## 2024-09-26 PROCEDURE — RECHECK

## 2024-09-26 RX ORDER — TIRZEPATIDE 2.5 MG/.5ML
2.5 INJECTION, SOLUTION SUBCUTANEOUS WEEKLY
COMMUNITY

## 2024-09-26 NOTE — PROGRESS NOTES
Patient last visit weight: 233 lb 9.6 oz  Patient current visit weight: 232.4 lb    If you are taking phentermine or other oral weight loss medications, are you experiencing any of the following symptoms:  Headache:   Blurred Vision:   Chest Pain:   Palpitations:  Insomnia:   SPECIFY ORAL MEDICATION AND DOSAGE:     If you are taking an injectable medication,  are you experiencing any of the following symptoms:  Bloating: YES- COUPLE OF DAY- MAKING BAD FOOD CHOICES  Nausea:NO  Vomiting: NO  Constipation: NO  Diarrhea:NO  SPECIFY INJECTABLE MEDICATION AND CURRENT DOSAGE: ZEPBOUND 2.5 MG- WILL PICK THE 5 MG       Vitals:    Is BP less than 100/60?NO  Is BP greater than 140/90?NO  Is HR greater than 100?NO  **If yes to any of the above, have patient relax and repeat in 5-10 minutes**    Repeat values:    Is BP less than 100/60?  Is BP greater than 140/90?  Is HR greater than 100?  **If values remain outside of ranges above, please consult provider for next steps**

## 2024-10-28 DIAGNOSIS — E66.9 OBESITY (BMI 30-39.9): ICD-10-CM

## 2024-10-28 NOTE — TELEPHONE ENCOUNTER
How are you tolerating the medication?   [] Nausea  [] Vomiting  [] Diarrhea  [x] Asymptomatic  [] Other:    Last visit weight: 232    Current weight:    Date of last injection: 10//25/24    How many injections do you have left: 0    Would you like an increase in your dose?  [] Yes   [x] No

## 2024-10-30 RX ORDER — TIRZEPATIDE 5 MG/.5ML
5 INJECTION, SOLUTION SUBCUTANEOUS WEEKLY
Qty: 2 ML | Refills: 0 | Status: SHIPPED | OUTPATIENT
Start: 2024-10-30 | End: 2024-11-08 | Stop reason: SDUPTHER

## 2024-11-08 ENCOUNTER — OFFICE VISIT (OUTPATIENT)
Dept: BARIATRICS | Facility: CLINIC | Age: 39
End: 2024-11-08

## 2024-11-08 VITALS
SYSTOLIC BLOOD PRESSURE: 132 MMHG | TEMPERATURE: 98.6 F | RESPIRATION RATE: 17 BRPM | WEIGHT: 227.2 LBS | HEIGHT: 66 IN | HEART RATE: 100 BPM | BODY MASS INDEX: 36.52 KG/M2 | DIASTOLIC BLOOD PRESSURE: 84 MMHG

## 2024-11-08 DIAGNOSIS — R73.03 PREDIABETES: Primary | ICD-10-CM

## 2024-11-08 DIAGNOSIS — E66.9 OBESITY (BMI 30-39.9): ICD-10-CM

## 2024-11-08 DIAGNOSIS — E55.9 VITAMIN D DEFICIENCY: ICD-10-CM

## 2024-11-08 RX ORDER — TIRZEPATIDE 5 MG/.5ML
5 INJECTION, SOLUTION SUBCUTANEOUS WEEKLY
Qty: 2 ML | Refills: 3 | Status: SHIPPED | OUTPATIENT
Start: 2024-11-08

## 2024-11-08 NOTE — ASSESSMENT & PLAN NOTE
Advised her to Take OTC Vitamin D 5,000 units daily after completing RX as prescribed by endocrinology.

## 2024-11-08 NOTE — PROGRESS NOTES
Assessment & Plan  Obesity (BMI 30-39.9)  Continue Zepbound 5mg weekly-- this dose can be increase if needed in the future.   She declines referral to dietician.  Discussed additional dietary changes such as breakfast options she can prepare at home and take on the go and low calorie snack options in place of chips/cookies.  Discussed importance of regular exercise- working up to 150 mins per week including strength training 2x per week.    Prediabetes  Expect improvement with weight loss.   Vitamin D deficiency  Advised her to Take OTC Vitamin D 5,000 units daily after completing RX as prescribed by endocrinology.        Return in about 3 months (around 2/8/2025).       Subjective:   Chief Complaint   Patient presents with    Follow-up     MWM 3M F/u; Waist 44in       Patient ID: Norah Amador  is a 39 y.o. female with excess weight/obesity here for Weight Management Follow up Visit    HPI: Here for Medical Weight Management Follow Up Visit    Was seen by Dr. OSORIO 8/23/2024 and started on Zepbound at that time.   Has seen RD in 2020    Obesity/Excess Weight:  Current BMI: Body mass index is 36.67 kg/m².   Initial Weight: 233  Last visit Weight: 233  Current Weight: 227    Current Weight Management Plan:   Current Medication: Zepbound  Medication Side effects:  reports mild bloating and dry mouth  Food Logging: No  Calorie Intake:  No, but eating less.   Given 1600 calorie meal plan at last visit with Dr. OSORIO.     Food Recall:  Breakfast: Usually on the go, DD breakfast wrap BEC or [employer]  With coffee with cream/sugar  Lunch: Wrap or sandwich, or soup  Dinner: Rice, meal, Salad or potatoes/meat  Bedtime Snack: Snacks-- less than before. Sometimes chips or cookie    Hydration: Water, Soda once per week  Dining out/takeout: Mostly for breakfast, sometimes lunch    Lifestyle History:    Exercise: Walking for leisure, not diallo gabout 20 mins a few times per week  Occupation:   Sleep: not sleeping great.  "      Wt Readings from Last 20 Encounters:   24 103 kg (227 lb 3.2 oz)   24 105 kg (232 lb 6.4 oz)   24 107 kg (235 lb 9.6 oz)   24 106 kg (233 lb 9.6 oz)   07/15/24 106 kg (234 lb 6.4 oz)   24 106 kg (234 lb)   23 101 kg (223 lb 6.4 oz)   23 102 kg (224 lb 6.4 oz)   22 95.7 kg (211 lb)   22 97.9 kg (215 lb 12.8 oz)   21 96.7 kg (213 lb 3.2 oz)   06/10/19 92.1 kg (203 lb)   19 91.7 kg (202 lb 3.2 oz)   19 93.6 kg (206 lb 6.4 oz)   13 74.8 kg (165 lb)        Review of Systems   Constitutional:  Positive for fatigue.       Past Medical History:   Diagnosis Date    PCOS (polycystic ovarian syndrome) 2024    Prediabetes        Past Surgical History:   Procedure Laterality Date     SECTION      MOUTH SURGERY          No Known Allergies     Objective:    /84 (BP Location: Left arm, Patient Position: Sitting)   Pulse 100   Temp 98.6 °F (37 °C) (Tympanic)   Resp 17   Ht 5' 6\" (1.676 m)   Wt 103 kg (227 lb 3.2 oz)   BMI 36.67 kg/m²     Physical Exam:  Constitutional:  she  appears well-developed and well-nourished. No distress.   HENT:   Head: Normocephalic and atraumatic.   Neck: Normal range of motion.   Pulmonary/Chest: Effort normal.   Musculoskeletal: Normal range of motion.   Neurological: she  is alert and oriented to person, place, and time.   Skin: she  is not diaphoretic.   Psychiatric: she  has a normal mood and affect. her  behavior is normal.        LABS:    Lab Results   Component Value Date    HGBA1C 6.2 (H) 2024      Lab Results   Component Value Date    SODIUM 135 2023    K 3.9 2023     2023    CO2 27 2023    AGAP 4 2023    BUN 11 2023    CREATININE 0.72 2023    GLUC 93 2019    GLUC 93 2019    GLUF 97 2023    CALCIUM 8.5 2023    AST 15 2023    ALT 18 2023    ALKPHOS 85 2023    TP 6.9 2023    TBILI 0.40 " 08/08/2023    EGFR 106 08/08/2023 08/08/2023   Lab Results   Component Value Date    UAX6YNKJXWLS 2.419 08/08/2023    TSH 1.96 03/12/2019    TSH 1.96 03/12/2019

## 2025-01-08 DIAGNOSIS — E66.9 OBESITY (BMI 30-39.9): ICD-10-CM

## 2025-01-09 RX ORDER — TIRZEPATIDE 5 MG/.5ML
5 INJECTION, SOLUTION SUBCUTANEOUS WEEKLY
Qty: 2 ML | Refills: 0 | Status: SHIPPED | OUTPATIENT
Start: 2025-01-09

## 2025-02-12 ENCOUNTER — RESULTS FOLLOW-UP (OUTPATIENT)
Dept: BARIATRICS | Facility: CLINIC | Age: 40
End: 2025-02-12

## 2025-02-12 ENCOUNTER — APPOINTMENT (OUTPATIENT)
Dept: LAB | Facility: HOSPITAL | Age: 40
End: 2025-02-12
Payer: COMMERCIAL

## 2025-02-12 DIAGNOSIS — E66.9 OBESITY (BMI 30-39.9): ICD-10-CM

## 2025-02-12 DIAGNOSIS — R73.03 PREDIABETES: ICD-10-CM

## 2025-02-12 LAB
EST. AVERAGE GLUCOSE BLD GHB EST-MCNC: 117 MG/DL
HBA1C MFR BLD: 5.7 %

## 2025-02-12 PROCEDURE — 36415 COLL VENOUS BLD VENIPUNCTURE: CPT

## 2025-02-12 PROCEDURE — 83036 HEMOGLOBIN GLYCOSYLATED A1C: CPT

## 2025-02-13 ENCOUNTER — OFFICE VISIT (OUTPATIENT)
Dept: BARIATRICS | Facility: CLINIC | Age: 40
End: 2025-02-13
Payer: COMMERCIAL

## 2025-02-13 VITALS
SYSTOLIC BLOOD PRESSURE: 122 MMHG | HEIGHT: 66 IN | RESPIRATION RATE: 16 BRPM | BODY MASS INDEX: 35.39 KG/M2 | HEART RATE: 78 BPM | WEIGHT: 220.2 LBS | DIASTOLIC BLOOD PRESSURE: 78 MMHG | TEMPERATURE: 98.2 F

## 2025-02-13 DIAGNOSIS — E66.9 OBESITY (BMI 30-39.9): Primary | ICD-10-CM

## 2025-02-13 DIAGNOSIS — R73.03 PREDIABETES: ICD-10-CM

## 2025-02-13 PROCEDURE — 99214 OFFICE O/P EST MOD 30 MIN: CPT | Performed by: PHYSICIAN ASSISTANT

## 2025-02-13 RX ORDER — TIRZEPATIDE 5 MG/.5ML
5 INJECTION, SOLUTION SUBCUTANEOUS WEEKLY
Qty: 2 ML | Refills: 0 | OUTPATIENT
Start: 2025-02-13

## 2025-02-13 RX ORDER — TIRZEPATIDE 7.5 MG/.5ML
7.5 INJECTION, SOLUTION SUBCUTANEOUS WEEKLY
Qty: 2 ML | Refills: 0 | Status: SHIPPED | OUTPATIENT
Start: 2025-02-13 | End: 2025-03-13

## 2025-02-13 NOTE — PROGRESS NOTES
Assessment & Plan  Obesity (BMI 30-39.9)  Continue Zepbound  She does notice mild/occasional bloating/epigastric discomfort.  Suggest trial of OTC Pepcid or GasX   Will increase to 7.5mg weekly x 4 weeks  Advised her to contact office in 2 weeks with update, if tolerating will increase to 10mg weekly  Continue To focus on lifestyle modification  Discussed importance of regular exercise. Recommend  working up to 150 minutes per week including full body strength training 2x per week. Strength training videos sent via [employer] at the last visit  Prediabetes  This has improved.        Return in about 3 months (around 5/13/2025).         Subjective:   Chief Complaint   Patient presents with   • Follow-up     MWM F/u; Waist 41in       Patient ID: Norah Amador  is a 39 y.o. female with excess weight/obesity here for Weight Management Follow up Visit    HPI: Here for Medical Weight Management Follow Up Visit    Obesity/Excess Weight:  Current BMI: Body mass index is 35.54 kg/m².   Initial Weight: 233  Last visit Weight: 227  Current Weight: 220    Current Weight Management Plan:   Current Medication: Zepbound 5mg  Medication Side effects:  Occasional bloating and mild upper abdominal pain.   Overall-- eating smaller portions    Food Recall:  Breakfast: Egg/melton wrap with coffee  Lunch: [place] or soup   Dinner: Rice, Meat, salad/veg -- smaller portions than before  Bedtime Snack:   Less snacks overall     Hydration: 2 bottles of water.     Lifestyle History:  Exercise: Walking-- 15 minutes during breaks at work        Wt Readings from Last 20 Encounters:   02/13/25 99.9 kg (220 lb 3.2 oz)   11/08/24 103 kg (227 lb 3.2 oz)   09/26/24 105 kg (232 lb 6.4 oz)   09/19/24 107 kg (235 lb 9.6 oz)   08/23/24 106 kg (233 lb 9.6 oz)   07/15/24 106 kg (234 lb 6.4 oz)   06/11/24 106 kg (234 lb)   08/07/23 101 kg (223 lb 6.4 oz)   03/27/23 102 kg (224 lb 6.4 oz)   06/04/22 95.7 kg (211 lb)   02/24/22 97.9 kg (215 lb 12.8 oz)  "  21 96.7 kg (213 lb 3.2 oz)   06/10/19 92.1 kg (203 lb)   19 91.7 kg (202 lb 3.2 oz)   19 93.6 kg (206 lb 6.4 oz)   13 74.8 kg (165 lb)        Review of Systems    Past Medical History:   Diagnosis Date   • PCOS (polycystic ovarian syndrome) 2024   • Prediabetes        Past Surgical History:   Procedure Laterality Date   •  SECTION     • MOUTH SURGERY          No Known Allergies     Objective:    /78 (BP Location: Left arm, Patient Position: Sitting)   Pulse 78   Temp 98.2 °F (36.8 °C) (Tympanic)   Resp 16   Ht 5' 6\" (1.676 m)   Wt 99.9 kg (220 lb 3.2 oz)   BMI 35.54 kg/m²     Physical Exam:  Constitutional:  she  appears well-developed and well-nourished. No distress.   HENT:   Head: Normocephalic and atraumatic.   Neck: Normal range of motion.   Pulmonary/Chest: Effort normal.   Musculoskeletal: Normal range of motion.   Neurological: she  is alert and oriented to person, place, and time.   Skin: she  is not diaphoretic.   Psychiatric: she  has a normal mood and affect. her  behavior is normal.        LABS:    Lab Results   Component Value Date    HGBA1C 5.7 (H) 2025      Lab Results   Component Value Date    SODIUM 135 2023    K 3.9 2023     2023    CO2 27 2023    AGAP 4 2023    BUN 11 2023    CREATININE 0.72 2023    GLUC 93 2019    GLUC 93 2019    GLUF 97 2023    CALCIUM 8.5 2023    AST 15 2023    ALT 18 2023    ALKPHOS 85 2023    TP 6.9 2023    TBILI 0.40 2023    EGFR 106 2023   Lab Results   Component Value Date    BYN9XMKAHSYL 2.419 2023    TSH 1.96 2019    TSH 1.96 2019      "

## 2025-03-19 ENCOUNTER — TELEPHONE (OUTPATIENT)
Dept: BARIATRICS | Facility: CLINIC | Age: 40
End: 2025-03-19

## 2025-03-19 DIAGNOSIS — E66.9 OBESITY (BMI 30-39.9): Primary | ICD-10-CM

## 2025-03-19 RX ORDER — TIRZEPATIDE 7.5 MG/.5ML
7.5 INJECTION, SOLUTION SUBCUTANEOUS WEEKLY
Qty: 2 ML | Refills: 2 | Status: SHIPPED | OUTPATIENT
Start: 2025-03-19

## 2025-03-19 NOTE — TELEPHONE ENCOUNTER
Can you let her Know I will send in the 7.5mg dose-- however insurances often will only cover this dose x 1 month.  I will send to the pharmacy but let us know if any problems getting the medication and if so, will increase to 10mg.

## 2025-03-19 NOTE — TELEPHONE ENCOUNTER
I spoke with patient and advised her that Rx for 7.5 mg was sent and that insurance usually only covers for 1 month. Pt will call office to let us know if she has trouble getting this dose filled and if so we will send Rx for Zepbound 10mg.

## 2025-03-19 NOTE — TELEPHONE ENCOUNTER
Pt states she feels good on the 7.5 mg and would like to stay on it but if she really needs to have the dose increased that's fine. Please determine if appropriate    Reason for call:   [x] Refill   [] Prior Auth  [] Other:     Office:   [] PCP/Provider -   [x] Specialty/Provider - [facility] / Aamir Sorenson PA-C     Medication:   ~ tirzepatide (Zepbound) 7.5 mg/0.5 mL auto-injector - Inject 0.5 mL (7.5 mg total) under the skin once a week for 28 days     Pharmacy:   [facility]/pharmacy #[id number] - [place], [place] - [address]   Does the patient have enough for 3 days?   [] Yes   [x] No - Send as HP to POD    How are you tolerating the medication?   [] Nausea  [] Vomiting  [] Diarrhea  [x] Asymptomatic  [] Other:    Last visit weight: 220 lb 3.2 oz     Current weight: Unsure    Date of last injection: 3/10    How many injections do you have left: 0

## 2025-04-29 DIAGNOSIS — E66.9 OBESITY (BMI 30-39.9): Primary | ICD-10-CM

## 2025-04-29 RX ORDER — TIRZEPATIDE 10 MG/.5ML
10 INJECTION, SOLUTION SUBCUTANEOUS WEEKLY
Qty: 2 ML | Refills: 3 | Status: SHIPPED | OUTPATIENT
Start: 2025-04-29

## 2025-05-13 ENCOUNTER — TELEPHONE (OUTPATIENT)
Dept: BARIATRICS | Facility: CLINIC | Age: 40
End: 2025-05-13

## 2025-05-29 DIAGNOSIS — E66.9 OBESITY (BMI 30-39.9): ICD-10-CM

## 2025-05-29 RX ORDER — TIRZEPATIDE 10 MG/.5ML
10 INJECTION, SOLUTION SUBCUTANEOUS WEEKLY
Qty: 2 ML | Refills: 0 | Status: SHIPPED | OUTPATIENT
Start: 2025-05-29 | End: 2025-06-02 | Stop reason: SDUPTHER

## 2025-05-29 NOTE — TELEPHONE ENCOUNTER
How are you tolerating the medication?   [] Nausea  [] Vomiting  [] Diarrhea  [x] Asymptomatic  [] Other:    Last visit weight: 220 lbs     Current weight: has not checked    Date of last injection: 5/26    How many injections do you have left: 0    Reason for call:   [x] Refill   [] Prior Auth  [] Other:     Office:   [] PCP/Provider -   [x] Specialty/Provider - Aamir Sorenson    Medication: tirzepatide (Zepbound) 10 mg/0.5 mL auto-injector    Dose/Frequency: Inject 0.5 mL (10 mg total) under the skin once a week    Quantity: 2 mL    Pharmacy: [facility]/pharmacy #[id number] - [place], [place] - [address] [phone]    Does the patient have enough for 3 days?   [] Yes   [] No - Send as HP to POD

## 2025-06-02 ENCOUNTER — OFFICE VISIT (OUTPATIENT)
Dept: BARIATRICS | Facility: CLINIC | Age: 40
End: 2025-06-02
Payer: COMMERCIAL

## 2025-06-02 VITALS
RESPIRATION RATE: 16 BRPM | HEART RATE: 82 BPM | WEIGHT: 210 LBS | SYSTOLIC BLOOD PRESSURE: 122 MMHG | DIASTOLIC BLOOD PRESSURE: 74 MMHG | BODY MASS INDEX: 33.75 KG/M2 | HEIGHT: 66 IN | TEMPERATURE: 98.5 F

## 2025-06-02 DIAGNOSIS — E55.9 VITAMIN D DEFICIENCY: ICD-10-CM

## 2025-06-02 DIAGNOSIS — R73.03 PREDIABETES: ICD-10-CM

## 2025-06-02 DIAGNOSIS — E66.9 OBESITY (BMI 30-39.9): Primary | ICD-10-CM

## 2025-06-02 PROBLEM — E66.812 OBESITY, CLASS II, BMI 35-39.9: Status: ACTIVE | Noted: 2025-06-02

## 2025-06-02 PROBLEM — E66.811 OBESITY, CLASS I, BMI 30-34.9: Status: ACTIVE | Noted: 2025-06-02

## 2025-06-02 PROCEDURE — 99214 OFFICE O/P EST MOD 30 MIN: CPT | Performed by: PHYSICIAN ASSISTANT

## 2025-06-02 RX ORDER — TIRZEPATIDE 10 MG/.5ML
10 INJECTION, SOLUTION SUBCUTANEOUS WEEKLY
Qty: 2 ML | Refills: 3 | Status: SHIPPED | OUTPATIENT
Start: 2025-06-02

## 2025-06-02 NOTE — ASSESSMENT & PLAN NOTE
-Patient is pursuing Conservative Program  -Initial weight loss goal of 5-10% weight loss for improved health  -Screening labs    Initial:233.6  Last Visit:220.2  Current:210  Change:-23.6lb    Assessment & Plan  She is encouraged to incorporate strength training into her exercise routine, aiming for an additional 1.5 hours per week.   An increase in water intake to approximately 64 ounces daily is recommended.   Recommend to start more meal plannnig      On zepbound 10mg. She has just begun this dose and recommend she continue it.  She has had some mild nausea in the AM but eating helps.  11 % BW loss.

## 2025-06-02 NOTE — PROGRESS NOTES
Assessment/Plan:    Obesity, Class I, BMI 30-34.9  -Patient is pursuing Conservative Program  -Initial weight loss goal of 5-10% weight loss for improved health  -Screening labs    Initial:233.6  Last Visit:220.2  Current:210  Change:-23.6lb    Assessment & Plan  She is encouraged to incorporate strength training into her exercise routine, aiming for an additional 1.5 hours per week.   An increase in water intake to approximately 64 ounces daily is recommended.   Recommend to start more meal plannnig      On zepbound 10mg. She has just begun this dose and recommend she continue it.  She has had some mild nausea in the AM but eating helps.  11 % BW loss.     Prediabetes  Her blood sugar levels have remained stable, and she has not experienced any recent hypoglycemic episodes. A comprehensive lab workup will be ordered to monitor her blood sugar levels, liver function, kidney function, and cholesterol levels.  To continue zepbound           Diagnoses and all orders for this visit:    Obesity (BMI 30-39.9)  -     tirzepatide (Zepbound) 10 mg/0.5 mL auto-injector; Inject 0.5 mL (10 mg total) under the skin once a week    BMI 33.0-33.9,adult  -     CBC and differential; Future  -     Comprehensive metabolic panel; Future  -     Vitamin D 25 hydroxy; Future  -     Hemoglobin A1C; Future  -     Lipid panel; Future    Prediabetes  -     CBC and differential; Future  -     Comprehensive metabolic panel; Future  -     Hemoglobin A1C; Future  -     Lipid panel; Future    Vitamin D deficiency  -     Vitamin D 25 hydroxy; Future          Subjective:   Chief Complaint   Patient presents with    Follow-up     MWM-3M F/u; Waist-42in        Patient ID: Norah Amador  is a 39 y.o. female with excess weight/obesity here to pursue weight managment.  Patient is pursuing Conservative Program.     HPI  History of Present Illness  The patient presents for weight loss, vitamin D deficiency, and prediabetes.    She has been on a regimen of  Zepbound 10 mg, with today marking her second dose. She reports experiencing mild nausea in the mornings, which she attributes to the medication, but notes that this symptom is alleviated after eating. She has observed a weight loss of 10 pounds since February 2025. She requested an increase in her medication dosage due to a perceived plateau in her weight loss.   She has not yet incorporated strength training into her routine, but plans to do so as she has noticed some loose skin. She maintains a regular walking routine and consumes approximately two bottles of water daily. Her diet includes a small iced coffee for breakfast, and a dinner consisting of meat, carbohydrates, rice or mashed potatoes, and a salad. She also snacks throughout the day. She has not experienced any recent episodes of hypoglycemia, which she typically identifies by feelings of shakiness.     Her last blood work was conducted in 2024. She is curious about her current prediabetic status. She is no longer under the care of an endocrinologist, having been cleared and referred to this clinic.    She was previously diagnosed with low vitamin D levels and was prescribed a supplement, which she has since discontinued. However, she has recently started taking an over-the-counter vitamin D supplement due to persistent daytime fatigue.    SOCIAL HISTORY  She works as a .    MEDICATIONS  Current: Zepbound, over-the-counter vitamin D      Wt Readings from Last 10 Encounters:   06/02/25 95.3 kg (210 lb)   02/13/25 99.9 kg (220 lb 3.2 oz)   11/08/24 103 kg (227 lb 3.2 oz)   09/26/24 105 kg (232 lb 6.4 oz)   09/19/24 107 kg (235 lb 9.6 oz)   08/23/24 106 kg (233 lb 9.6 oz)   07/15/24 106 kg (234 lb 6.4 oz)   06/11/24 106 kg (234 lb)   08/07/23 101 kg (223 lb 6.4 oz)   03/27/23 102 kg (224 lb 6.4 oz)       Food logging:  Increased appetite/cravings:  Exercise:walking more   Hydration:small ice coffee, 32 oz water     Diet Recall:  Breakfast wrap  at [employer] and small iced coffee   Lunch varies-often on the road  Dinner: meat, vegetable , starch    Snacks (cut back)    The following portions of the patient's history were reviewed and updated as appropriate: She  has a past medical history of PCOS (polycystic ovarian syndrome) (2024) and Prediabetes.  She   Patient Active Problem List    Diagnosis Date Noted    Obesity, Class I, BMI 30-34.9 2025    Vitamin D deficiency 2024    Prediabetes 07/15/2024    Elevated dehydroepiandrosterone (DHEA) level 07/15/2024    Irregular menses 07/15/2024     She  has a past surgical history that includes  section and Mouth surgery ().  Her family history includes Breast cancer in her family; Diabetes in her mother; Heart disease in her father.  She  reports that she has never smoked. She has never used smokeless tobacco. She reports current alcohol use. She reports that she does not use drugs.  Current Outpatient Medications   Medication Sig Dispense Refill    Acetaminophen (TYLENOL 8 HOUR PO) Take by mouth if needed      B Complex Vitamins (B COMPLEX 1 PO) Take 1 tablet by mouth in the morning. Occasionally    .      Barberry-Oreg Grape-Goldenseal (BERBERINE COMPLEX PO) Take by mouth      Cholecalciferol (Vitamin D3) 125 MCG (5000 UT) CAPS 1 cap daily      magnesium 30 MG tablet Take 30 mg by mouth in the morning and 30 mg in the evening.      tirzepatide (Zepbound) 10 mg/0.5 mL auto-injector Inject 0.5 mL (10 mg total) under the skin once a week 2 mL 3    Turmeric 400 MG CAPS Take by mouth in the morning.      zinc gluconate 50 mg tablet Take 50 mg by mouth in the morning.      ibuprofen (MOTRIN) 600 mg tablet Take 1 tablet (600 mg total) by mouth every 6 (six) hours as needed for mild pain or moderate pain for up to 5 days (Patient not taking: Reported on 2024) 20 tablet 0    naproxen (NAPROSYN) 500 mg tablet Take 1 tablet (500 mg total) by mouth 2 (two) times a day with meals for 10 days (Patient  "not taking: Reported on 2/13/2025) 20 tablet 0     No current facility-administered medications for this visit.     Current Outpatient Medications on File Prior to Visit   Medication Sig    Acetaminophen (TYLENOL 8 HOUR PO) Take by mouth if needed    B Complex Vitamins (B COMPLEX 1 PO) Take 1 tablet by mouth in the morning. Occasionally    .    Barberry-Oreg Grape-Goldenseal (BERBERINE COMPLEX PO) Take by mouth    Cholecalciferol (Vitamin D3) 125 MCG (5000 UT) CAPS 1 cap daily    magnesium 30 MG tablet Take 30 mg by mouth in the morning and 30 mg in the evening.    Turmeric 400 MG CAPS Take by mouth in the morning.    zinc gluconate 50 mg tablet Take 50 mg by mouth in the morning.    [DISCONTINUED] tirzepatide (Zepbound) 10 mg/0.5 mL auto-injector Inject 0.5 mL (10 mg total) under the skin once a week    ibuprofen (MOTRIN) 600 mg tablet Take 1 tablet (600 mg total) by mouth every 6 (six) hours as needed for mild pain or moderate pain for up to 5 days (Patient not taking: Reported on 11/8/2024)    naproxen (NAPROSYN) 500 mg tablet Take 1 tablet (500 mg total) by mouth 2 (two) times a day with meals for 10 days (Patient not taking: Reported on 2/13/2025)     No current facility-administered medications on file prior to visit.     She has no known allergies..    Review of Systems   Constitutional:  Negative for fever.   Respiratory:  Negative for shortness of breath.    Cardiovascular:  Negative for chest pain and palpitations.   Gastrointestinal:  Negative for abdominal pain, constipation, diarrhea and vomiting.   Genitourinary:  Negative for difficulty urinating.   Skin:  Negative for rash.   Neurological:  Negative for headaches.   Psychiatric/Behavioral:  Negative for dysphoric mood.        Objective:    /74   Pulse 82   Temp 98.5 °F (36.9 °C)   Resp 16   Ht 5' 6\" (1.676 m)   Wt 95.3 kg (210 lb)   BMI 33.89 kg/m²      Physical Exam  Vitals and nursing note reviewed.   Constitutional:       General: She " is not in acute distress.     Appearance: She is well-developed. She is obese.   HENT:      Head: Normocephalic and atraumatic.     Eyes:      Conjunctiva/sclera: Conjunctivae normal.     Neck:      Thyroid: No thyromegaly.   Pulmonary:      Effort: Pulmonary effort is normal. No respiratory distress.     Skin:     Findings: No rash (visible).     Neurological:      Mental Status: She is alert and oriented to person, place, and time.     Psychiatric:         Behavior: Behavior normal.

## 2025-06-02 NOTE — ASSESSMENT & PLAN NOTE
Her blood sugar levels have remained stable, and she has not experienced any recent hypoglycemic episodes. A comprehensive lab workup will be ordered to monitor her blood sugar levels, liver function, kidney function, and cholesterol levels.  To continue zepbound

## 2025-07-01 ENCOUNTER — OFFICE VISIT (OUTPATIENT)
Dept: FAMILY MEDICINE CLINIC | Facility: CLINIC | Age: 40
End: 2025-07-01
Payer: COMMERCIAL

## 2025-07-01 ENCOUNTER — HOSPITAL ENCOUNTER (OUTPATIENT)
Dept: CT IMAGING | Facility: HOSPITAL | Age: 40
Discharge: HOME | End: 2025-07-01
Attending: FAMILY MEDICINE
Payer: COMMERCIAL

## 2025-07-01 VITALS
OXYGEN SATURATION: 99 % | TEMPERATURE: 98.1 F | RESPIRATION RATE: 17 BRPM | DIASTOLIC BLOOD PRESSURE: 80 MMHG | BODY MASS INDEX: 33.11 KG/M2 | HEIGHT: 66 IN | SYSTOLIC BLOOD PRESSURE: 124 MMHG | HEART RATE: 69 BPM | WEIGHT: 206 LBS

## 2025-07-01 DIAGNOSIS — Z12.31 ENCOUNTER FOR SCREENING MAMMOGRAM FOR BREAST CANCER: ICD-10-CM

## 2025-07-01 DIAGNOSIS — R26.89 BALANCE PROBLEM: ICD-10-CM

## 2025-07-01 DIAGNOSIS — E66.9 OBESITY (BMI 30-39.9): ICD-10-CM

## 2025-07-01 DIAGNOSIS — S09.90XA TRAUMATIC INJURY OF HEAD, INITIAL ENCOUNTER: ICD-10-CM

## 2025-07-01 DIAGNOSIS — R42 LIGHTHEADEDNESS: ICD-10-CM

## 2025-07-01 DIAGNOSIS — F43.9 STRESS: ICD-10-CM

## 2025-07-01 DIAGNOSIS — R42 LIGHTHEADEDNESS: Primary | ICD-10-CM

## 2025-07-01 DIAGNOSIS — Z00.00 ANNUAL PHYSICAL EXAM: Primary | ICD-10-CM

## 2025-07-01 PROCEDURE — 99396 PREV VISIT EST AGE 40-64: CPT | Performed by: FAMILY MEDICINE

## 2025-07-01 PROCEDURE — 70450 CT HEAD/BRAIN W/O DYE: CPT

## 2025-07-01 NOTE — PROGRESS NOTES
"Adult Annual Physical  Name: Norah Amador      : 1985      MRN: [id number]  Encounter Provider: Rach Gomez DO  Encounter Date: 2025   Encounter department: [facility] PRIMARY [facility]ARE    :  Chief Complaint   Patient presents with   • Physical Exam     Pt is here for annual physical exam visit.      Patient Instructions   Patient Education     Routine physical for adults   The Basics   Written by the doctors and editors at [employer]To[employer]   What is a physical? -- A physical is a routine visit, or \"check-up,\" with your doctor. You might also hear it called a \"wellness visit\" or \"preventive visit.\"  During each visit, the doctor will:   Ask about your physical and mental health   Ask about your habits, behaviors, and lifestyle   Do an exam   Give you vaccines if needed   Talk to you about any medicines you take   Give advice about your health   Answer your questions  Getting regular check-ups is an important part of taking care of your health. It can help your doctor find and treat any problems you have. But it's also important for preventing health problems.  A routine physical is different from a \"sick visit.\" A sick visit is when you see a doctor because of a health concern or problem. Since physicals are scheduled ahead of time, you can think about what you want to ask the doctor.  How often should I get a physical? -- It depends on your age and health. In general, for people age 21 years and older:   If you are younger than 50 years, you might be able to get a physical every 3 years.   If you are 50 years or older, your doctor might recommend a physical every year.  If you have an ongoing health condition, like diabetes or high blood pressure, your doctor will probably want to see you more often.  What happens during a physical? -- In general, each visit will include:   Physical exam - The doctor or nurse will check your height, weight, heart rate, and blood pressure. They will also look " "at your eyes and ears. They will ask about how you are feeling and whether you have any symptoms that bother you.   Medicines - It's a good idea to bring a list of all the medicines you take to each doctor visit. Your doctor will talk to you about your medicines and answer any questions. Tell them if you are having any side effects that bother you. You should also tell them if you are having trouble paying for any of your medicines.   Habits and behaviors - This includes:   Your diet   Your exercise habits   Whether you smoke, drink alcohol, or use drugs   Whether you are sexually active   Whether you feel safe at home  Your doctor will talk to you about things you can do to improve your health and lower your risk of health problems. They will also offer help and support. For example, if you want to quit smoking, they can give you advice and might prescribe medicines. If you want to improve your diet or get more physical activity, they can help you with this, too.   Lab tests, if needed - The tests you get will depend on your age and situation. For example, your doctor might want to check your:   Cholesterol   Blood sugar   Iron level   Vaccines - The recommended vaccines will depend on your age, health, and what vaccines you already had. Vaccines are very important because they can prevent certain serious or deadly infections.   Discussion of screening - \"Screening\" means checking for diseases or other health problems before they cause symptoms. Your doctor can recommend screening based on your age, risk, and preferences. This might include tests to check for:   Cancer, such as breast, prostate, cervical, ovarian, colorectal, prostate, lung, or skin cancer   Sexually transmitted infections, such as chlamydia and gonorrhea   Mental health conditions like depression and anxiety  Your doctor will talk to you about the different types of screening tests. They can help you decide which screenings to have. They can also " explain what the results might mean.   Answering questions - The physical is a good time to ask the doctor or nurse questions about your health. If needed, they can refer you to other doctors or specialists, too.  Adults older than 65 years often need other care, too. As you get older, your doctor will talk to you about:   How to prevent falling at home   Hearing or vision tests   Memory testing   How to take your medicines safely   Making sure that you have the help and support you need at home  All topics are updated as new evidence becomes available and our peer review process is complete.  This topic retrieved from [employer] on: May 02, 2024.  Topic 027284 Version 1.0  Release: 32.4.3 - C32.122  © 2024 UpToDate, Inc. and/or its affiliates. All rights reserved.  Consumer Information Use and Disclaimer   Disclaimer: This generalized information is a limited summary of diagnosis, treatment, and/or medication information. It is not meant to be comprehensive and should be used as a tool to help the user understand and/or assess potential diagnostic and treatment options. It does NOT include all information about conditions, treatments, medications, side effects, or risks that may apply to a specific patient. It is not intended to be medical advice or a substitute for the medical advice, diagnosis, or treatment of a health care provider based on the health care provider's examination and assessment of a patient's specific and unique circumstances. Patients must speak with a health care provider for complete information about their health, medical questions, and treatment options, including any risks or benefits regarding use of medications. This information does not endorse any treatments or medications as safe, effective, or approved for treating a specific patient. UpToDate, Inc. and its affiliates disclaim any warranty or liability relating to this information or the use thereof.The use of this information is  governed by the Terms of Use, available at https://www.woltersCampusTapuwer.com/en/know/clinical-effectiveness-terms. 2024© UpToDate, Inc. and its affiliates and/or licensors. All rights reserved.  Copyright   © 2024 UpToDate, Inc. and/or its affiliates. All rights reserved.  Lose weight as directed avoid processed foods and strength training and get at least  8 hours sleep. See weight loss specialist for zepbound and patient will notify them of lightheadedness with zepbound. Stress management encouraged. Stress from work and school and personal relationshiip and new puppy. Check CT brain for hx of lightheadedness and off balance feeling. Wear glasses as directed and use sunglasses as directed. Check labs as directed by weight loss specialists. Get mammograma nd see GYN.     Assessment & Plan  Annual physical exam  Check labs        Encounter for screening mammogram for breast cancer  Check mammogram.   Orders:  •  Mammo screening bilateral w 3d and cad; Future    Obesity (BMI 30-39.9)  Lose weight as directed and sees weight loss specialist         Stress  Stress management       Lightheadedness  Check lightheadedness  Orders:  •  CT head wo contrast; Future    Balance problem  Check ct scan brain  Orders:  •  CT head wo contrast; Future    Traumatic injury of head, initial encounter  Check ct scan brain  Orders:  •  CT head wo contrast; Future        Preventive Screenings:    - Cervical cancer screening: screening up-to-date     Immunizations:  - Immunizations due: Tdap      Depression Screening and Follow-up Plan: Patient was screened for depression during today's encounter. They screened negative with a PHQ-2 score of 0.          History of Present Illness     Adult Annual Physical:  Patient presents for annual physical. Here for general PE, single and has 2 children and is a , non smoker and drinks occasional alcohol. Has not seen GYN or got mammogram as directed and is on zepbound for 6 months. And has  "had lightheadedness and head pressure. At night has had some chest congestion as well. Patient sees [facility] weight management. Patient hid head a couple weeks before as well and no headaches and no vomiting. Patient is on maintenance stage and monitor weight loss. Patient has increased symptoms with increased dose of zepbound. Patient only walks. Patient  Head trauma on top of head couple weeks ago. No sensory or motor deficits. .     Diet and Physical Activity:  - Diet/Nutrition: no special diet.  - Exercise: no formal exercise.    Depression Screening:  - PHQ-2 Score: 0    General Health:  - Sleep: sleeps well and 4-6 hours of sleep on average.  - Hearing: normal hearing bilateral ears.  - Vision: vision problems and wears glasses.  - Dental: regular dental visits, brushes teeth twice daily and does not floss.    /GYN Health:  - Follows with GYN: no.   - Last menstrual cycle: 6/10/2025.   - History of STDs: no    Advanced Care Planning:  - Has an advanced directive?: no    - Has a durable medical POA?: no      Review of Systems   Constitutional: Negative.    HENT: Negative.     Eyes: Negative.    Respiratory: Negative.     Cardiovascular: Negative.    Gastrointestinal: Negative.    Endocrine: Negative.    Genitourinary: Negative.    Musculoskeletal: Negative.    Skin: Negative.    Allergic/Immunologic: Negative.    Neurological:  Positive for light-headedness.        Hit top of head on corner of tv a couple weeks ago   Hematological: Negative.    Psychiatric/Behavioral: Negative.       Medications Ordered Prior to Encounter[1]     Objective   /80 (BP Location: Left arm, Patient Position: Sitting, Cuff Size: Standard)   Pulse 69   Temp 98.1 °F (36.7 °C) (Temporal)   Resp 17   Ht 5' 6.14\" (1.68 m)   Wt 93.4 kg (206 lb)   SpO2 99%   BMI 33.11 kg/m²     Physical Exam  Constitutional:       Appearance: She is well-developed. She is obese.   HENT:      Head: Normocephalic and atraumatic.      Right " Ear: External ear normal.      Left Ear: External ear normal.      Nose: Nose normal.      Mouth/Throat:      Mouth: Mucous membranes are moist.     Eyes:      Conjunctiva/sclera: Conjunctivae normal.      Pupils: Pupils are equal, round, and reactive to light.       Cardiovascular:      Rate and Rhythm: Normal rate and regular rhythm.      Pulses: Normal pulses.      Heart sounds: Normal heart sounds.   Pulmonary:      Effort: Pulmonary effort is normal.      Breath sounds: Normal breath sounds.   Abdominal:      General: Abdomen is flat.      Palpations: Abdomen is soft.     Musculoskeletal:         General: Normal range of motion.      Cervical back: Normal range of motion and neck supple.     Skin:     General: Skin is warm and dry.      Capillary Refill: Capillary refill takes less than 2 seconds.     Neurological:      General: No focal deficit present.      Mental Status: She is alert and oriented to person, place, and time. Mental status is at baseline.      Deep Tendon Reflexes: Reflexes are normal and symmetric.     Psychiatric:         Mood and Affect: Mood normal.         Behavior: Behavior normal.         Thought Content: Thought content normal.         Judgment: Judgment normal.       Administrative Statements   I have spent a total time of 35 minutes in caring for this patient on the day of the visit/encounter including Diagnostic results, Prognosis, Risks and benefits of tx options, Instructions for management, Patient and family education, Importance of tx compliance, Risk factor reductions, Impressions, Counseling / Coordination of care, Documenting in the medical record, Reviewing/placing orders in the medical record (including tests, medications, and/or procedures), and Obtaining or reviewing history  .         [1]  Current Outpatient Medications on File Prior to Visit   Medication Sig Dispense Refill   • Acetaminophen (TYLENOL 8 HOUR PO) Take by mouth if needed     • B Complex Vitamins (B  COMPLEX 1 PO) Take 1 tablet by mouth in the morning. Occasionally    .     • Barberry-Oreg Grape-Goldenseal (BERBERINE COMPLEX PO) Take by mouth     • Cholecalciferol (Vitamin D3) 125 MCG (5000 UT) CAPS 1 cap daily     • magnesium 30 MG tablet Take 30 mg by mouth in the morning and 30 mg in the evening.     • tirzepatide (Zepbound) 10 mg/0.5 mL auto-injector Inject 0.5 mL (10 mg total) under the skin once a week 2 mL 3   • Turmeric 400 MG CAPS Take by mouth in the morning.     • zinc gluconate 50 mg tablet Take 50 mg by mouth in the morning.     • ibuprofen (MOTRIN) 600 mg tablet Take 1 tablet (600 mg total) by mouth every 6 (six) hours as needed for mild pain or moderate pain for up to 5 days (Patient not taking: Reported on 11/8/2024) 20 tablet 0   • naproxen (NAPROSYN) 500 mg tablet Take 1 tablet (500 mg total) by mouth 2 (two) times a day with meals for 10 days (Patient not taking: Reported on 2/13/2025) 20 tablet 0     No current facility-administered medications on file prior to visit.

## 2025-07-01 NOTE — PATIENT INSTRUCTIONS
"Patient Education     Routine physical for adults   The Basics   Written by the doctors and editors at [facility]   What is a physical? -- A physical is a routine visit, or \"check-up,\" with your doctor. You might also hear it called a \"wellness visit\" or \"preventive visit.\"  During each visit, the doctor will:   Ask about your physical and mental health   Ask about your habits, behaviors, and lifestyle   Do an exam   Give you vaccines if needed   Talk to you about any medicines you take   Give advice about your health   Answer your questions  Getting regular check-ups is an important part of taking care of your health. It can help your doctor find and treat any problems you have. But it's also important for preventing health problems.  A routine physical is different from a \"sick visit.\" A sick visit is when you see a doctor because of a health concern or problem. Since physicals are scheduled ahead of time, you can think about what you want to ask the doctor.  How often should I get a physical? -- It depends on your age and health. In general, for people age 21 years and older:   If you are younger than 50 years, you might be able to get a physical every 3 years.   If you are 50 years or older, your doctor might recommend a physical every year.  If you have an ongoing health condition, like diabetes or high blood pressure, your doctor will probably want to see you more often.  What happens during a physical? -- In general, each visit will include:   Physical exam - The doctor or nurse will check your height, weight, heart rate, and blood pressure. They will also look at your eyes and ears. They will ask about how you are feeling and whether you have any symptoms that bother you.   Medicines - It's a good idea to bring a list of all the medicines you take to each doctor visit. Your doctor will talk to you about your medicines and answer any questions. Tell them if you are having any side effects that bother you. You " "should also tell them if you are having trouble paying for any of your medicines.   Habits and behaviors - This includes:   Your diet   Your exercise habits   Whether you smoke, drink alcohol, or use drugs   Whether you are sexually active   Whether you feel safe at home  Your doctor will talk to you about things you can do to improve your health and lower your risk of health problems. They will also offer help and support. For example, if you want to quit smoking, they can give you advice and might prescribe medicines. If you want to improve your diet or get more physical activity, they can help you with this, too.   Lab tests, if needed - The tests you get will depend on your age and situation. For example, your doctor might want to check your:   Cholesterol   Blood sugar   Iron level   Vaccines - The recommended vaccines will depend on your age, health, and what vaccines you already had. Vaccines are very important because they can prevent certain serious or deadly infections.   Discussion of screening - \"Screening\" means checking for diseases or other health problems before they cause symptoms. Your doctor can recommend screening based on your age, risk, and preferences. This might include tests to check for:   Cancer, such as breast, prostate, cervical, ovarian, colorectal, prostate, lung, or skin cancer   Sexually transmitted infections, such as chlamydia and gonorrhea   Mental health conditions like depression and anxiety  Your doctor will talk to you about the different types of screening tests. They can help you decide which screenings to have. They can also explain what the results might mean.   Answering questions - The physical is a good time to ask the doctor or nurse questions about your health. If needed, they can refer you to other doctors or specialists, too.  Adults older than 65 years often need other care, too. As you get older, your doctor will talk to you about:   How to prevent falling at " home   Hearing or vision tests   Memory testing   How to take your medicines safely   Making sure that you have the help and support you need at home  All topics are updated as new evidence becomes available and our peer review process is complete.  This topic retrieved from [employer] on: May 02, 2024.  Topic 214341 Version 1.0  Release: 32.4.3 - C32.122  © 2024 UpToDate, Inc. and/or its affiliates. All rights reserved.  Consumer Information Use and Disclaimer   Disclaimer: This generalized information is a limited summary of diagnosis, treatment, and/or medication information. It is not meant to be comprehensive and should be used as a tool to help the user understand and/or assess potential diagnostic and treatment options. It does NOT include all information about conditions, treatments, medications, side effects, or risks that may apply to a specific patient. It is not intended to be medical advice or a substitute for the medical advice, diagnosis, or treatment of a health care provider based on the health care provider's examination and assessment of a patient's specific and unique circumstances. Patients must speak with a health care provider for complete information about their health, medical questions, and treatment options, including any risks or benefits regarding use of medications. This information does not endorse any treatments or medications as safe, effective, or approved for treating a specific patient. UpToDate, Inc. and its affiliates disclaim any warranty or liability relating to this information or the use thereof.The use of this information is governed by the Terms of Use, available at https://www.woltersEDANuwer.com/en/know/clinical-effectiveness-terms. 2024© UpToDate, Inc. and its affiliates and/or licensors. All rights reserved.  Copyright   © 2024 UpToDate, Inc. and/or its affiliates. All rights reserved.  Lose weight as directed avoid processed foods and strength training and get at least  8  hours sleep. See weight loss specialist for zepbound and patient will notify them of lightheadedness with zepbound. Stress management encouraged. Stress from work and school and personal relationshiip and new puppy. Check CT brain for hx of lightheadedness and off balance feeling. Wear glasses as directed and use sunglasses as directed. Check labs as directed by weight loss specialists. Get mammograma nd see GYN.

## 2025-07-03 ENCOUNTER — NURSE TRIAGE (OUTPATIENT)
Age: 40
End: 2025-07-03

## 2025-07-03 DIAGNOSIS — E66.9 OBESITY (BMI 30-39.9): Primary | ICD-10-CM

## 2025-07-03 NOTE — TELEPHONE ENCOUNTER
"REASON FOR CONVERSATION: Medication Reaction    SYMPTOMS: Pt calling in to report chest congestion x 1-2 weeks, especially with laying down and states she went to PCP and was told it may be related to indigestion with zepbound. Denies acid reflux or sore throat.   Pt reports Lightheadedness at times as well and states her PCP referred her to neurology.       OTHER HEALTH INFORMATION: Denies constipation.     PROTOCOL DISPOSITION: Discuss With PCP and Callback by Nurse Within 1 Hour    CARE ADVICE PROVIDED: Advised that office will call back with next steps/advice.     PRACTICE FOLLOW-UP: Pt is out of medication and wanting to know if she should continue based on symptoms or have decrease or other advice.   Please advise. Call back # [phone]          Provider: Aamir Sorenson    Medication and dose currently: zepbound 10mg    Side effects: lightheaded  Current Weight:  206lb    Due for injection: Monday   Doses Left: 0    Pharmacy: [facility]           Reason for Disposition   Caller has URGENT medicine question about med that PCP or specialist prescribed and triager unable to answer question    Answer Assessment - Initial Assessment Questions  1. NAME of MEDICINE: \"What medicine(s) are you calling about?\"      Zepbound   2. QUESTION: \"What is your question?\" (e.g., double dose of medicine, side effect)      Should she refill, go down in dose, or other recommendation   3. PRESCRIBER: \"Who prescribed the medicine?\" Reason: if prescribed by specialist, call should be referred to that group.      Aamir Sorenson  4. SYMPTOMS: \"Do you have any symptoms?\" If Yes, ask: \"What symptoms are you having?\"  \"How bad are the symptoms (e.g., mild, moderate, severe)      Lightheadedness, chest congestion with laying down    Protocols used: Medication Question Call-Adult-[place]    "

## 2025-07-04 RX ORDER — TIRZEPATIDE 10 MG/.5ML
10 INJECTION, SOLUTION SUBCUTANEOUS WEEKLY
Qty: 2 ML | Refills: 3 | Status: SHIPPED | OUTPATIENT
Start: 2025-07-04

## 2025-07-16 ENCOUNTER — APPOINTMENT (OUTPATIENT)
Dept: LAB | Facility: HOSPITAL | Age: 40
End: 2025-07-16
Payer: COMMERCIAL

## 2025-07-16 ENCOUNTER — OFFICE VISIT (OUTPATIENT)
Dept: FAMILY MEDICINE CLINIC | Facility: CLINIC | Age: 40
End: 2025-07-16
Payer: COMMERCIAL

## 2025-07-16 VITALS
OXYGEN SATURATION: 98 % | BODY MASS INDEX: 32.33 KG/M2 | HEART RATE: 82 BPM | TEMPERATURE: 97.5 F | DIASTOLIC BLOOD PRESSURE: 86 MMHG | SYSTOLIC BLOOD PRESSURE: 142 MMHG | WEIGHT: 206 LBS | HEIGHT: 67 IN | RESPIRATION RATE: 18 BRPM

## 2025-07-16 DIAGNOSIS — E66.9 OBESITY (BMI 30-39.9): Primary | ICD-10-CM

## 2025-07-16 DIAGNOSIS — R73.03 PREDIABETES: ICD-10-CM

## 2025-07-16 DIAGNOSIS — E55.9 VITAMIN D DEFICIENCY: ICD-10-CM

## 2025-07-16 DIAGNOSIS — E78.6 LOW HDL (UNDER 40): ICD-10-CM

## 2025-07-16 LAB
25(OH)D3 SERPL-MCNC: 20.1 NG/ML (ref 30–100)
ALBUMIN SERPL BCG-MCNC: 4 G/DL (ref 3.5–5)
ALP SERPL-CCNC: 87 U/L (ref 34–104)
ALT SERPL W P-5'-P-CCNC: 13 U/L (ref 7–52)
ANION GAP SERPL CALCULATED.3IONS-SCNC: 5 MMOL/L (ref 4–13)
AST SERPL W P-5'-P-CCNC: 13 U/L (ref 13–39)
BASOPHILS # BLD AUTO: 0.04 THOUSANDS/ÂΜL (ref 0–0.1)
BASOPHILS NFR BLD AUTO: 0 % (ref 0–1)
BILIRUB SERPL-MCNC: 0.44 MG/DL (ref 0.2–1)
BUN SERPL-MCNC: 10 MG/DL (ref 5–25)
CALCIUM SERPL-MCNC: 8.4 MG/DL (ref 8.4–10.2)
CHLORIDE SERPL-SCNC: 106 MMOL/L (ref 96–108)
CHOLEST SERPL-MCNC: 152 MG/DL (ref ?–200)
CO2 SERPL-SCNC: 26 MMOL/L (ref 21–32)
CREAT SERPL-MCNC: 0.76 MG/DL (ref 0.6–1.3)
EOSINOPHIL # BLD AUTO: 0.09 THOUSAND/ÂΜL (ref 0–0.61)
EOSINOPHIL NFR BLD AUTO: 1 % (ref 0–6)
ERYTHROCYTE [DISTWIDTH] IN BLOOD BY AUTOMATED COUNT: 12.1 % (ref 11.6–15.1)
EST. AVERAGE GLUCOSE BLD GHB EST-MCNC: 111 MG/DL
GFR SERPL CREATININE-BSD FRML MDRD: 98 ML/MIN/1.73SQ M
GLUCOSE P FAST SERPL-MCNC: 84 MG/DL (ref 65–99)
HBA1C MFR BLD: 5.5 %
HCT VFR BLD AUTO: 37.6 % (ref 34.8–46.1)
HDLC SERPL-MCNC: 39 MG/DL
HGB BLD-MCNC: 12.6 G/DL (ref 11.5–15.4)
IMM GRANULOCYTES # BLD AUTO: 0.05 THOUSAND/UL (ref 0–0.2)
IMM GRANULOCYTES NFR BLD AUTO: 1 % (ref 0–2)
LDLC SERPL CALC-MCNC: 88 MG/DL (ref 0–100)
LYMPHOCYTES # BLD AUTO: 2.41 THOUSANDS/ÂΜL (ref 0.6–4.47)
LYMPHOCYTES NFR BLD AUTO: 24 % (ref 14–44)
MCH RBC QN AUTO: 31.7 PG (ref 26.8–34.3)
MCHC RBC AUTO-ENTMCNC: 33.5 G/DL (ref 31.4–37.4)
MCV RBC AUTO: 95 FL (ref 82–98)
MONOCYTES # BLD AUTO: 0.75 THOUSAND/ÂΜL (ref 0.17–1.22)
MONOCYTES NFR BLD AUTO: 7 % (ref 4–12)
NEUTROPHILS # BLD AUTO: 6.83 THOUSANDS/ÂΜL (ref 1.85–7.62)
NEUTS SEG NFR BLD AUTO: 67 % (ref 43–75)
NONHDLC SERPL-MCNC: 113 MG/DL
NRBC BLD AUTO-RTO: 0 /100 WBCS
PLATELET # BLD AUTO: 353 THOUSANDS/UL (ref 149–390)
PMV BLD AUTO: 8.6 FL (ref 8.9–12.7)
POTASSIUM SERPL-SCNC: 3.8 MMOL/L (ref 3.5–5.3)
PROT SERPL-MCNC: 6.9 G/DL (ref 6.4–8.4)
RBC # BLD AUTO: 3.98 MILLION/UL (ref 3.81–5.12)
SODIUM SERPL-SCNC: 137 MMOL/L (ref 135–147)
TRIGL SERPL-MCNC: 125 MG/DL (ref ?–150)
WBC # BLD AUTO: 10.17 THOUSAND/UL (ref 4.31–10.16)

## 2025-07-16 PROCEDURE — 80053 COMPREHEN METABOLIC PANEL: CPT

## 2025-07-16 PROCEDURE — 83036 HEMOGLOBIN GLYCOSYLATED A1C: CPT

## 2025-07-16 PROCEDURE — 80061 LIPID PANEL: CPT

## 2025-07-16 PROCEDURE — 36415 COLL VENOUS BLD VENIPUNCTURE: CPT

## 2025-07-16 PROCEDURE — 85025 COMPLETE CBC W/AUTO DIFF WBC: CPT

## 2025-07-16 PROCEDURE — 99214 OFFICE O/P EST MOD 30 MIN: CPT | Performed by: FAMILY MEDICINE

## 2025-07-16 PROCEDURE — 82306 VITAMIN D 25 HYDROXY: CPT

## 2025-07-16 NOTE — PATIENT INSTRUCTIONS
Increase exercise to increase hdl and still awaiting other labs hga1c and vitamin D is pending and also labs otherwise stable. Call if any problems. Lose weight to get BMI lower than 25. Monitor lightheadedness with Zepbound.

## 2025-07-16 NOTE — PROGRESS NOTES
"Name: Norah Amador      : 1985      MRN: [id number]  Encounter Provider: Rach Gomez DO  Encounter Date: 2025   Encounter department: [facility] PRIMARY CARE  Chief Complaint   Patient presents with   • Follow-up     Pt is here for lab results visit.      Patient Instructions   Increase exercise to increase hdl and still awaiting other labs hga1c and vitamin D is pending and also labs otherwise stable. Call if any problems. Lose weight to get BMI lower than 25. Monitor lightheadedness with Zepbound.     Assessment & Plan  Obesity (BMI 30-39.9)  Lose weight to get Bmi lower than 25         Low HDL (under 40)  Exercise to increase hdl       Prediabetes  Low sugar diet encouraged and is on Zepbound for weight loss            History of Present Illness     Follow-up (Pt is here for lab results visit. )      Review of Systems   Constitutional: Negative.    HENT: Negative.     Eyes: Negative.    Respiratory: Negative.     Cardiovascular: Negative.    Gastrointestinal: Negative.    Endocrine: Negative.    Genitourinary: Negative.    Musculoskeletal: Negative.    Skin: Negative.    Allergic/Immunologic: Negative.    Neurological: Negative.    Hematological: Negative.    Psychiatric/Behavioral: Negative.       Past Medical History[1]  Past Surgical History[2]  Family History[3]  Social History[4]  Medications[5]  No Known Allergies  Immunization History   Administered Date(s) Administered   • COVID-19 PFIZER VACCINE 0.3 ML IM 2021, 2021   • INFLUENZA 2010, 10/28/2011, 2013   • Rho (D) Immune Globulin 2014   • Tdap 2014     Objective   /86 (BP Location: Left arm, Patient Position: Sitting, Cuff Size: Standard)   Pulse 82   Temp 97.5 °F (36.4 °C) (Temporal)   Resp 18   Ht 5' 6.54\" (1.69 m)   Wt 93.4 kg (206 lb)   LMP 2025 (Approximate)   SpO2 98%   BMI 32.72 kg/m²     Physical Exam  Constitutional:       Appearance: She is " well-developed.   HENT:      Head: Normocephalic and atraumatic.      Right Ear: External ear normal.      Left Ear: External ear normal.      Nose: Nose normal.     Eyes:      Conjunctiva/sclera: Conjunctivae normal.      Pupils: Pupils are equal, round, and reactive to light.       Cardiovascular:      Rate and Rhythm: Normal rate and regular rhythm.      Pulses: Normal pulses.      Heart sounds: Normal heart sounds.   Pulmonary:      Effort: Pulmonary effort is normal.      Breath sounds: Normal breath sounds.     Musculoskeletal:         General: Normal range of motion.      Cervical back: Normal range of motion and neck supple.     Skin:     General: Skin is warm and dry.      Capillary Refill: Capillary refill takes less than 2 seconds.     Neurological:      General: No focal deficit present.      Mental Status: She is alert and oriented to person, place, and time. Mental status is at baseline.      Deep Tendon Reflexes: Reflexes are normal and symmetric.     Psychiatric:         Mood and Affect: Mood normal.         Behavior: Behavior normal.         Thought Content: Thought content normal.         Judgment: Judgment normal.       Administrative Statements   I have spent a total time of 30 minutes in caring for this patient on the day of the visit/encounter including Diagnostic results, Prognosis, Risks and benefits of tx options, Instructions for management, Patient and family education, Importance of tx compliance, Risk factor reductions, Impressions, Counseling / Coordination of care, Documenting in the medical record, Reviewing/placing orders in the medical record (including tests, medications, and/or procedures), and Obtaining or reviewing history  .         [1]  Past Medical History:  Diagnosis Date   • PCOS (polycystic ovarian syndrome) 2024   • Prediabetes    [2]  Past Surgical History:  Procedure Laterality Date   •  SECTION     • MOUTH SURGERY     [3]  Family History  Problem  Relation Name Age of Onset   • Breast cancer Family     • Diabetes Mother     • Heart disease Father     [4]  Social History  Tobacco Use   • Smoking status: Never   • Smokeless tobacco: Never   Vaping Use   • Vaping status: Never Used   Substance and Sexual Activity   • Alcohol use: Yes     Comment: socially   • Drug use: Never   • Sexual activity: Not Currently   [5]  Current Outpatient Medications on File Prior to Visit   Medication Sig   • Acetaminophen (TYLENOL 8 HOUR PO) Take by mouth if needed   • tirzepatide (Zepbound) 10 mg/0.5 mL auto-injector Inject 0.5 mL (10 mg total) under the skin once a week   • B Complex Vitamins (B COMPLEX 1 PO) Take 1 tablet by mouth in the morning. Occasionally    . (Patient not taking: Reported on 7/16/2025)   • Barberry-Oreg Grape-Goldenseal (BERBERINE COMPLEX PO) Take by mouth (Patient not taking: Reported on 7/16/2025)   • Cholecalciferol (Vitamin D3) 125 MCG (5000 UT) CAPS 1 cap daily (Patient not taking: Reported on 7/16/2025)   • ibuprofen (MOTRIN) 600 mg tablet Take 1 tablet (600 mg total) by mouth every 6 (six) hours as needed for mild pain or moderate pain for up to 5 days (Patient not taking: Reported on 11/8/2024)   • magnesium 30 MG tablet Take 30 mg by mouth in the morning and 30 mg in the evening. (Patient not taking: Reported on 7/16/2025)   • naproxen (NAPROSYN) 500 mg tablet Take 1 tablet (500 mg total) by mouth 2 (two) times a day with meals for 10 days (Patient not taking: Reported on 2/13/2025)   • Turmeric 400 MG CAPS Take by mouth in the morning. (Patient not taking: Reported on 7/16/2025)   • zinc gluconate 50 mg tablet Take 50 mg by mouth in the morning. (Patient not taking: Reported on 7/16/2025)

## 2025-07-21 ENCOUNTER — TELEPHONE (OUTPATIENT)
Dept: BARIATRICS | Facility: CLINIC | Age: 40
End: 2025-07-21

## 2025-07-28 ENCOUNTER — OFFICE VISIT (OUTPATIENT)
Dept: BARIATRICS | Facility: CLINIC | Age: 40
End: 2025-07-28
Payer: COMMERCIAL

## 2025-07-28 VITALS
DIASTOLIC BLOOD PRESSURE: 82 MMHG | HEIGHT: 66 IN | SYSTOLIC BLOOD PRESSURE: 128 MMHG | HEART RATE: 96 BPM | BODY MASS INDEX: 33.49 KG/M2 | WEIGHT: 208.4 LBS | RESPIRATION RATE: 16 BRPM | TEMPERATURE: 99 F

## 2025-07-28 DIAGNOSIS — E66.811 OBESITY, CLASS I, BMI 30-34.9: Primary | ICD-10-CM

## 2025-07-28 DIAGNOSIS — R42 LIGHTHEADED: ICD-10-CM

## 2025-07-28 DIAGNOSIS — Z51.81 MEDICATION MONITORING ENCOUNTER: ICD-10-CM

## 2025-07-28 DIAGNOSIS — R73.03 PREDIABETES: ICD-10-CM

## 2025-07-28 PROCEDURE — 99214 OFFICE O/P EST MOD 30 MIN: CPT | Performed by: PHYSICIAN ASSISTANT

## 2025-07-28 RX ORDER — METFORMIN HYDROCHLORIDE 750 MG/1
TABLET, EXTENDED RELEASE ORAL
Qty: 30 TABLET | Refills: 2 | Status: SHIPPED | OUTPATIENT
Start: 2025-07-28